# Patient Record
Sex: FEMALE | Race: WHITE | NOT HISPANIC OR LATINO | Employment: FULL TIME | URBAN - METROPOLITAN AREA
[De-identification: names, ages, dates, MRNs, and addresses within clinical notes are randomized per-mention and may not be internally consistent; named-entity substitution may affect disease eponyms.]

---

## 2017-03-27 DIAGNOSIS — E03.9 ADULT HYPOTHYROIDISM: ICD-10-CM

## 2017-03-27 DIAGNOSIS — E55.9 AVITAMINOSIS D: Primary | ICD-10-CM

## 2017-04-06 ENCOUNTER — LAB (OUTPATIENT)
Dept: ENDOCRINOLOGY | Age: 52
End: 2017-04-06

## 2017-04-06 DIAGNOSIS — E03.9 ADULT HYPOTHYROIDISM: ICD-10-CM

## 2017-04-06 DIAGNOSIS — E55.9 AVITAMINOSIS D: Primary | ICD-10-CM

## 2017-04-06 DIAGNOSIS — E55.9 AVITAMINOSIS D: ICD-10-CM

## 2017-04-08 LAB — 25(OH)D3+25(OH)D2 SERPL-MCNC: 40.6 NG/ML (ref 30–100)

## 2017-04-09 LAB
ALBUMIN SERPL-MCNC: 4.5 G/DL (ref 3.5–5.2)
ALBUMIN/GLOB SERPL: 1.6 G/DL
ALP SERPL-CCNC: 58 U/L (ref 39–117)
ALT SERPL-CCNC: 21 U/L (ref 1–33)
AST SERPL-CCNC: 19 U/L (ref 1–32)
BILIRUB SERPL-MCNC: 0.2 MG/DL (ref 0.1–1.2)
BUN SERPL-MCNC: 14 MG/DL (ref 6–20)
BUN/CREAT SERPL: 22.6 (ref 7–25)
CALCIUM SERPL-MCNC: 9.3 MG/DL (ref 8.6–10.5)
CHLORIDE SERPL-SCNC: 98 MMOL/L (ref 98–107)
CO2 SERPL-SCNC: 23.1 MMOL/L (ref 22–29)
CREAT SERPL-MCNC: 0.62 MG/DL (ref 0.57–1)
FT4I SERPL CALC-MCNC: 4.1 (ref 1.2–4.9)
GLOBULIN SER CALC-MCNC: 2.9 GM/DL
GLUCOSE SERPL-MCNC: 94 MG/DL (ref 65–99)
HBA1C MFR BLD: 5.53 % (ref 4.8–5.6)
POTASSIUM SERPL-SCNC: 4.3 MMOL/L (ref 3.5–5.2)
PROT SERPL-MCNC: 7.4 G/DL (ref 6–8.5)
SODIUM SERPL-SCNC: 139 MMOL/L (ref 136–145)
T3FREE SERPL-MCNC: 3 PG/ML (ref 2–4.4)
T3RU NFR SERPL: 28 % (ref 24–39)
T4 FREE SERPL-MCNC: 2.05 NG/DL (ref 0.93–1.7)
T4 SERPL-MCNC: 14.7 UG/DL (ref 4.5–12)
THYROGLOB AB SERPL-ACNC: <1 IU/ML
THYROGLOB SERPL-MCNC: 0.6 NG/ML
THYROGLOB SERPL-MCNC: NORMAL NG/ML
TSH SERPL DL<=0.005 MIU/L-ACNC: 0.2 UIU/ML (ref 0.45–4.5)

## 2017-04-18 ENCOUNTER — OFFICE VISIT (OUTPATIENT)
Dept: ENDOCRINOLOGY | Age: 52
End: 2017-04-18

## 2017-04-18 VITALS
BODY MASS INDEX: 25.79 KG/M2 | HEIGHT: 65 IN | SYSTOLIC BLOOD PRESSURE: 110 MMHG | WEIGHT: 154.8 LBS | DIASTOLIC BLOOD PRESSURE: 68 MMHG

## 2017-04-18 DIAGNOSIS — E55.9 VITAMIN D DEFICIENCY: ICD-10-CM

## 2017-04-18 DIAGNOSIS — I10 ESSENTIAL HYPERTENSION: ICD-10-CM

## 2017-04-18 DIAGNOSIS — Z71.6 TOBACCO ABUSE COUNSELING: ICD-10-CM

## 2017-04-18 DIAGNOSIS — E04.0 SIMPLE GOITER: ICD-10-CM

## 2017-04-18 DIAGNOSIS — N95.1 MENOPAUSAL SYMPTOM: ICD-10-CM

## 2017-04-18 DIAGNOSIS — E03.9 ACQUIRED HYPOTHYROIDISM: ICD-10-CM

## 2017-04-18 DIAGNOSIS — E04.1 THYROID NODULE: Primary | ICD-10-CM

## 2017-04-18 PROBLEM — G47.00 INSOMNIA: Status: ACTIVE | Noted: 2017-04-18

## 2017-04-18 PROBLEM — E89.0 H/O THYROIDECTOMY: Status: ACTIVE | Noted: 2017-04-18

## 2017-04-18 PROCEDURE — 99214 OFFICE O/P EST MOD 30 MIN: CPT | Performed by: NURSE PRACTITIONER

## 2017-04-18 PROCEDURE — 99406 BEHAV CHNG SMOKING 3-10 MIN: CPT | Performed by: NURSE PRACTITIONER

## 2017-04-18 RX ORDER — ERGOCALCIFEROL 1.25 MG/1
CAPSULE ORAL
Qty: 26 CAPSULE | Refills: 2 | Status: SHIPPED | OUTPATIENT
Start: 2017-04-18 | End: 2017-10-20 | Stop reason: SDUPTHER

## 2017-04-18 RX ORDER — LEVOTHYROXINE SODIUM 175 UG/1
175 TABLET ORAL DAILY
Qty: 90 TABLET | Refills: 3 | Status: SHIPPED | OUTPATIENT
Start: 2017-04-18 | End: 2017-10-20 | Stop reason: SDUPTHER

## 2017-04-18 RX ORDER — TRAZODONE HYDROCHLORIDE 50 MG/1
50 TABLET ORAL NIGHTLY
Qty: 30 TABLET | Refills: 5 | Status: SHIPPED | OUTPATIENT
Start: 2017-04-18 | End: 2017-10-20 | Stop reason: SDUPTHER

## 2017-04-18 NOTE — PROGRESS NOTES
"Darleen Holt is a 51 y.o. female is here today for follow-up.  Chief Complaint   Patient presents with   • Hypothyroidism     recent labs   • Vitamin D Deficiency     pt states only wants to discuss lab results     /68  Ht 65\" (165.1 cm)  Wt 154 lb 12.8 oz (70.2 kg)  BMI 25.76 kg/m2  Current Outpatient Prescriptions on File Prior to Visit   Medication Sig   • ADVAIR DISKUS 500-50 MCG/DOSE DISKUS Inhale 1 puff 2 (Two) Times a Day.   • LEVOXYL 175 MCG tablet Take 1 tablet by mouth daily.   • vitamin D (ERGOCALCIFEROL) 00360 UNITS capsule capsule Take one capsule by mouth twice a week     No current facility-administered medications on file prior to visit.      History reviewed. No pertinent family history.  Social History   Substance Use Topics   • Smoking status: Never Smoker   • Smokeless tobacco: Never Used   • Alcohol use No     No Known Allergies      History of Present Illness  Encounter Diagnoses   Name Primary?   • Thyroid nodule Yes   • Acquired hypothyroidism    • Vitamin D deficiency    • Essential hypertension    • Menopausal symptom    • Simple goiter    • Tobacco abuse counseling        This is a 51-year-old female patient here today for routine follow-up visit for the above-mentioned problems.  She is requesting assistance for smoking cessation at today's visit.  She has been in the past however she restarted smoking.  She is requesting Chantix.  She is also requesting medication to help her sleep at night.  Due to her potential risk for lung cancer she is also requesting a chest x-ray be done.  All this was discussed with Dr. Bowman and he approved the chest x-ray as well as treatment for insomnia.  She is taking her medications as prescribed.  She is complaining of some weight gain.  She denies any signs and symptoms of hyperthyroidism.  She does not want to change her current dose of levoxyl. She is  but going thru a divorce. She denies any cough but does have sob due to " smoking.   The following portions of the patient's history were reviewed and updated as appropriate: allergies, current medications, past family history, past medical history, past social history, past surgical history and problem list.    Review of Systems   Constitutional: Negative for fatigue.   HENT: Negative for trouble swallowing.    Eyes: Negative for visual disturbance.   Respiratory: Negative for shortness of breath.    Cardiovascular: Negative for leg swelling.   Gastrointestinal: Negative for nausea.   Endocrine: Negative for polyuria.   Genitourinary: Negative for urgency.   Musculoskeletal: Negative for joint swelling.   Skin: Negative for wound.   Allergic/Immunologic: Negative for immunocompromised state.   Neurological: Negative for numbness.   Hematological: Negative for adenopathy.   Psychiatric/Behavioral: The patient is not nervous/anxious.        Objective   Physical Exam   Constitutional: She is oriented to person, place, and time. She appears well-developed and well-nourished. No distress.   HENT:   Head: Normocephalic and atraumatic.   Right Ear: External ear normal.   Left Ear: External ear normal.   Nose: Nose normal.   Mouth/Throat: Oropharynx is clear and moist. No oropharyngeal exudate.   Eyes: Conjunctivae and EOM are normal. Pupils are equal, round, and reactive to light. Right eye exhibits no discharge. Left eye exhibits no discharge. No scleral icterus.   Neck: Normal range of motion. Neck supple. No JVD present. No tracheal deviation present. No thyromegaly present.   Healed the scar of thyroidectomy in lower neck.   Cardiovascular: Normal rate, regular rhythm, normal heart sounds and intact distal pulses.  Exam reveals no gallop and no friction rub.    No murmur heard.  Pulmonary/Chest: Effort normal and breath sounds normal. No stridor. No respiratory distress. She has no wheezes. She has no rales. She exhibits no tenderness.   Abdominal: Soft. Bowel sounds are normal. She  exhibits no distension and no mass. There is no tenderness. There is no rebound and no guarding. No hernia.   Musculoskeletal: Normal range of motion. She exhibits no edema, tenderness or deformity.   Lymphadenopathy:     She has no cervical adenopathy.   Neurological: She is alert and oriented to person, place, and time. She has normal reflexes. She displays normal reflexes. No cranial nerve deficit. She exhibits normal muscle tone. Coordination normal.   Skin: Skin is warm and dry. No rash noted. She is not diaphoretic. No erythema. No pallor.   Psychiatric: She has a normal mood and affect. Her behavior is normal. Judgment and thought content normal.   Nursing note and vitals reviewed.    Results for orders placed or performed in visit on 04/06/17   Comprehensive Metabolic Panel   Result Value Ref Range    Glucose 94 65 - 99 mg/dL    BUN 14 6 - 20 mg/dL    Creatinine 0.62 0.57 - 1.00 mg/dL    eGFR Non African Am 101 >60 mL/min/1.73    eGFR African Am 122 >60 mL/min/1.73    BUN/Creatinine Ratio 22.6 7.0 - 25.0    Sodium 139 136 - 145 mmol/L    Potassium 4.3 3.5 - 5.2 mmol/L    Chloride 98 98 - 107 mmol/L    Total CO2 23.1 22.0 - 29.0 mmol/L    Calcium 9.3 8.6 - 10.5 mg/dL    Total Protein 7.4 6.0 - 8.5 g/dL    Albumin 4.50 3.50 - 5.20 g/dL    Globulin 2.9 gm/dL    A/G Ratio 1.6 g/dL    Total Bilirubin 0.2 0.1 - 1.2 mg/dL    Alkaline Phosphatase 58 39 - 117 U/L    AST (SGOT) 19 1 - 32 U/L    ALT (SGPT) 21 1 - 33 U/L   Hemoglobin A1c   Result Value Ref Range    Hemoglobin A1C 5.53 4.80 - 5.60 %   Comprehensive Thyroglobulin   Result Value Ref Range    Thyroglobulin Ab <1.0 IU/mL    Thyroglobulin 0.6 ng/mL    Thyroglobulin (TG-INDER) Comment ng/mL   T4, Free   Result Value Ref Range    Free T4 2.05 (H) 0.93 - 1.70 ng/dL   T3, Free   Result Value Ref Range    T3, Free 3.0 2.0 - 4.4 pg/mL   Thyroid Panel With TSH   Result Value Ref Range    TSH 0.198 (L) 0.450 - 4.500 uIU/mL    T4, Total 14.7 (H) 4.5 - 12.0 ug/dL    T3  Uptake 28 24 - 39 %    Free Thyroxine Index 4.1 1.2 - 4.9   Vitamin D 25 Hydroxy   Result Value Ref Range    25 Hydroxy, Vitamin D 40.6 30.0 - 100.0 ng/mL         Assessment/Plan   Denisa was seen today for hypothyroidism and vitamin d deficiency.    Diagnoses and all orders for this visit:    Thyroid nodule    Acquired hypothyroidism    Vitamin D deficiency    Essential hypertension    Menopausal symptom    Simple goiter      Encounter Diagnoses   Name Primary?   • Thyroid nodule Yes   • Acquired hypothyroidism    • Vitamin D deficiency    • Essential hypertension    • Menopausal symptom    • Simple goiter    • Tobacco abuse counseling          In summary, patient was seen and examined.  I discussed her chest x-ray and treatment for insomnia with Dr. Bowman.  She will have a chest x-ray done and will be noted of the results along with any further recommendations.  Her recent labs reviewed and she was provided a copy.  I've started her on trazodone 50 mg as needed daily at bedtime for insomnia.  She will continue all her current medications as prescribed.  She was given a prescription along with a coupon for Chantix with instructions to take as directed.  She has a history of thyroidectomy.  Metabolically she is stable.  She will follow-up with Dr. Bowman her next appointment.  She was counseled at today's visit for 10 minutes for smoking cessation.

## 2017-04-18 NOTE — PATIENT INSTRUCTIONS
Take chantix according to instructions  Continue all other meds the same  Chest xray  trazadone 50 mg once at bedtime for sleep

## 2017-05-15 RX ORDER — VARENICLINE TARTRATE 1 MG/1
1 TABLET, FILM COATED ORAL 2 TIMES DAILY
Qty: 60 TABLET | Refills: 3 | Status: SHIPPED | OUTPATIENT
Start: 2017-05-15 | End: 2017-12-04

## 2017-05-16 ENCOUNTER — TELEPHONE (OUTPATIENT)
Dept: ENDOCRINOLOGY | Age: 52
End: 2017-05-16

## 2017-09-18 RX ORDER — LEVOTHYROXINE SODIUM 175 UG/1
TABLET ORAL
Qty: 90 TABLET | Refills: 2 | Status: SHIPPED | OUTPATIENT
Start: 2017-09-18 | End: 2017-10-20 | Stop reason: SDUPTHER

## 2017-09-28 DIAGNOSIS — E03.9 ACQUIRED HYPOTHYROIDISM: Primary | ICD-10-CM

## 2017-09-28 DIAGNOSIS — E55.9 VITAMIN D DEFICIENCY: ICD-10-CM

## 2017-10-20 ENCOUNTER — OFFICE VISIT (OUTPATIENT)
Dept: ENDOCRINOLOGY | Age: 52
End: 2017-10-20

## 2017-10-20 VITALS
SYSTOLIC BLOOD PRESSURE: 102 MMHG | DIASTOLIC BLOOD PRESSURE: 72 MMHG | WEIGHT: 148.8 LBS | BODY MASS INDEX: 24.79 KG/M2 | HEIGHT: 65 IN

## 2017-10-20 DIAGNOSIS — E55.9 VITAMIN D DEFICIENCY: ICD-10-CM

## 2017-10-20 DIAGNOSIS — I10 ESSENTIAL HYPERTENSION: ICD-10-CM

## 2017-10-20 DIAGNOSIS — E04.1 THYROID NODULE: ICD-10-CM

## 2017-10-20 DIAGNOSIS — E03.9 ACQUIRED HYPOTHYROIDISM: Primary | ICD-10-CM

## 2017-10-20 DIAGNOSIS — E04.0 SIMPLE GOITER: ICD-10-CM

## 2017-10-20 DIAGNOSIS — E89.0 H/O THYROIDECTOMY: ICD-10-CM

## 2017-10-20 PROCEDURE — 99214 OFFICE O/P EST MOD 30 MIN: CPT | Performed by: NURSE PRACTITIONER

## 2017-10-20 RX ORDER — TRAZODONE HYDROCHLORIDE 50 MG/1
50 TABLET ORAL NIGHTLY
Qty: 90 TABLET | Refills: 1 | Status: SHIPPED | OUTPATIENT
Start: 2017-10-20 | End: 2018-05-04 | Stop reason: ALTCHOICE

## 2017-10-20 RX ORDER — LEVOTHYROXINE SODIUM 175 UG/1
175 TABLET ORAL DAILY
Qty: 90 TABLET | Refills: 3 | Status: SHIPPED | OUTPATIENT
Start: 2017-10-20 | End: 2017-12-04 | Stop reason: SDUPTHER

## 2017-10-20 RX ORDER — ERGOCALCIFEROL 1.25 MG/1
CAPSULE ORAL
Qty: 26 CAPSULE | Refills: 2 | Status: SHIPPED | OUTPATIENT
Start: 2017-10-20 | End: 2018-05-11 | Stop reason: SDUPTHER

## 2017-10-20 NOTE — PROGRESS NOTES
"Darleen Holt is a 52 y.o. female is here today for follow-up.  Chief Complaint   Patient presents with   • Hypothyroidism     no recent labs, pt is not taking chantix   • Vitamin D Deficiency   • Hypertension   • Goiter     /72  Ht 65\" (165.1 cm)  Wt 148 lb 12.8 oz (67.5 kg)  BMI 24.76 kg/m2  Current Outpatient Prescriptions on File Prior to Visit   Medication Sig   • ADVAIR DISKUS 500-50 MCG/DOSE DISKUS INHALE 1 PUFF 2 (TWO) TIMES A DAY.   • [DISCONTINUED] LEVOXYL 175 MCG tablet Take 1 tablet by mouth Daily.   • [DISCONTINUED] traZODone (DESYREL) 50 MG tablet Take 1 tablet by mouth Every Night.   • [DISCONTINUED] vitamin D (ERGOCALCIFEROL) 22851 UNITS capsule capsule Take one capsule by mouth twice a week   • varenicline (CHANTIX CONTINUING MONTH ARPAN) 1 MG tablet Take 1 tablet by mouth 2 (Two) Times a Day.   • [DISCONTINUED] LEVOXYL 175 MCG tablet TAKE 1 TABLET BY MOUTH DAILY.     No current facility-administered medications on file prior to visit.      No family history on file.  Social History   Substance Use Topics   • Smoking status: Never Smoker   • Smokeless tobacco: Never Used   • Alcohol use No     No Known Allergies    History of Present Illness   Chief Complaint   Patient presents with   • Hypothyroidism     no recent labs, pt is not taking chantix   • Vitamin D Deficiency   • Hypertension   • Goiter     52-year-old female patient here today for routine follow-up for hypothyroidism, vitamin D deficiency, goiter, and hypertension.  She is taking her medications as prescribed.  She denies any signs and symptoms of hypothyroidism.  She has a lot of stress due to her work life.  She has stopped smoking.  She has a history of asthma that she feels is not controlled at this time.  She is also been taking her Advair once daily.  She is managed by her primary care provider for her asthma.  She states she has shortness of breath several times a day.      The following portions of the " patient's history were reviewed and updated as appropriate: allergies, current medications, past family history, past medical history, past social history, past surgical history and problem list.    Review of Systems   Constitutional: Negative for fatigue.   HENT: Negative for trouble swallowing.    Eyes: Negative for visual disturbance.   Respiratory: Negative for shortness of breath.    Cardiovascular: Negative for leg swelling.   Endocrine: Negative for polyphagia.   Skin: Negative for wound.   Neurological: Negative for numbness.       Objective   Physical Exam   Constitutional: She is oriented to person, place, and time. She appears well-developed and well-nourished. No distress.   HENT:   Head: Normocephalic and atraumatic.   Right Ear: External ear normal.   Left Ear: External ear normal.   Nose: Nose normal.   Mouth/Throat: Oropharynx is clear and moist. No oropharyngeal exudate.   Eyes: Conjunctivae and EOM are normal. Pupils are equal, round, and reactive to light. Right eye exhibits no discharge. Left eye exhibits no discharge. No scleral icterus.   Neck: Normal range of motion. Neck supple. No JVD present. No tracheal deviation present. No thyromegaly present.   Healed the scar of thyroidectomy in lower neck.   Cardiovascular: Normal rate, regular rhythm, normal heart sounds and intact distal pulses.  Exam reveals no gallop and no friction rub.    No murmur heard.  Pulmonary/Chest: Effort normal and breath sounds normal. No stridor. No respiratory distress. She has no wheezes. She has no rales. She exhibits no tenderness.   Abdominal: Soft. Bowel sounds are normal. She exhibits no distension and no mass. There is no tenderness. There is no rebound and no guarding. No hernia.   Musculoskeletal: Normal range of motion. She exhibits no edema, tenderness or deformity.   Lymphadenopathy:     She has no cervical adenopathy.   Neurological: She is alert and oriented to person, place, and time. She has normal  reflexes. She displays normal reflexes. No cranial nerve deficit. She exhibits normal muscle tone. Coordination normal.   Skin: Skin is warm and dry. No rash noted. She is not diaphoretic. No erythema. No pallor.   Psychiatric: She has a normal mood and affect. Her behavior is normal. Judgment and thought content normal.   Nursing note and vitals reviewed.    Results for orders placed or performed in visit on 04/06/17   Comprehensive Metabolic Panel   Result Value Ref Range    Glucose 94 65 - 99 mg/dL    BUN 14 6 - 20 mg/dL    Creatinine 0.62 0.57 - 1.00 mg/dL    eGFR Non African Am 101 >60 mL/min/1.73    eGFR African Am 122 >60 mL/min/1.73    BUN/Creatinine Ratio 22.6 7.0 - 25.0    Sodium 139 136 - 145 mmol/L    Potassium 4.3 3.5 - 5.2 mmol/L    Chloride 98 98 - 107 mmol/L    Total CO2 23.1 22.0 - 29.0 mmol/L    Calcium 9.3 8.6 - 10.5 mg/dL    Total Protein 7.4 6.0 - 8.5 g/dL    Albumin 4.50 3.50 - 5.20 g/dL    Globulin 2.9 gm/dL    A/G Ratio 1.6 g/dL    Total Bilirubin 0.2 0.1 - 1.2 mg/dL    Alkaline Phosphatase 58 39 - 117 U/L    AST (SGOT) 19 1 - 32 U/L    ALT (SGPT) 21 1 - 33 U/L   Hemoglobin A1c   Result Value Ref Range    Hemoglobin A1C 5.53 4.80 - 5.60 %   Comprehensive Thyroglobulin   Result Value Ref Range    Thyroglobulin Ab <1.0 IU/mL    Thyroglobulin 0.6 ng/mL    Thyroglobulin (TG-INDER) Comment ng/mL   T4, Free   Result Value Ref Range    Free T4 2.05 (H) 0.93 - 1.70 ng/dL   T3, Free   Result Value Ref Range    T3, Free 3.0 2.0 - 4.4 pg/mL   Thyroid Panel With TSH   Result Value Ref Range    TSH 0.198 (L) 0.450 - 4.500 uIU/mL    T4, Total 14.7 (H) 4.5 - 12.0 ug/dL    T3 Uptake 28 24 - 39 %    Free Thyroxine Index 4.1 1.2 - 4.9   Vitamin D 25 Hydroxy   Result Value Ref Range    25 Hydroxy, Vitamin D 40.6 30.0 - 100.0 ng/mL         Assessment/Plan   Problems Addressed this Visit        Cardiovascular and Mediastinum    Hypertension       Digestive    Vitamin D deficiency       Endocrine    Hypothyroidism -  Primary    Relevant Medications    LEVOXYL 175 MCG tablet    Simple goiter    Relevant Medications    LEVOXYL 175 MCG tablet    Thyroid nodule    Relevant Medications    LEVOXYL 175 MCG tablet        In summary, patient was seen and examined.  She was educated regarding uncontrolled asthma.  She's been advised to take her Advair twice daily as prescribed.  She will have extensive laboratory evaluation done at today's visit.  Also results with any further recommendations.  In the meantime she is to continue all her current medications as prescribed.  Her DEXA scan was reviewed from one year ago which show she has osteopenia.  She has no signs and symptoms of hypothyroidism at today's visit.  She will follow up Dr. Bowman in 6 months with labs.  I've encouraged her to contact the office should she have any questions or concerns prior to then.

## 2017-10-21 LAB
25(OH)D3+25(OH)D2 SERPL-MCNC: 43.9 NG/ML (ref 30–100)
ALBUMIN SERPL-MCNC: 4.2 G/DL (ref 3.5–5.2)
ALBUMIN/GLOB SERPL: 1.4 G/DL
ALP SERPL-CCNC: 62 U/L (ref 39–117)
ALT SERPL-CCNC: 17 U/L (ref 1–33)
AST SERPL-CCNC: 22 U/L (ref 1–32)
BILIRUB SERPL-MCNC: 0.2 MG/DL (ref 0.1–1.2)
BUN SERPL-MCNC: 8 MG/DL (ref 6–20)
BUN/CREAT SERPL: 11.4 (ref 7–25)
CALCIUM SERPL-MCNC: 9.6 MG/DL (ref 8.6–10.5)
CHLORIDE SERPL-SCNC: 100 MMOL/L (ref 98–107)
CHOLEST SERPL-MCNC: 190 MG/DL (ref 0–200)
CO2 SERPL-SCNC: 24.4 MMOL/L (ref 22–29)
CREAT SERPL-MCNC: 0.7 MG/DL (ref 0.57–1)
FT4I SERPL CALC-MCNC: 4.6 (ref 1.2–4.9)
GFR SERPLBLD CREATININE-BSD FMLA CKD-EPI: 106 ML/MIN/1.73
GFR SERPLBLD CREATININE-BSD FMLA CKD-EPI: 87 ML/MIN/1.73
GLOBULIN SER CALC-MCNC: 3 GM/DL
GLUCOSE SERPL-MCNC: 114 MG/DL (ref 65–99)
HBA1C MFR BLD: 5.26 % (ref 4.8–5.6)
HDLC SERPL-MCNC: 72 MG/DL (ref 40–60)
LDLC SERPL CALC-MCNC: 101 MG/DL (ref 0–100)
POTASSIUM SERPL-SCNC: 3.9 MMOL/L (ref 3.5–5.2)
PROT SERPL-MCNC: 7.2 G/DL (ref 6–8.5)
SODIUM SERPL-SCNC: 141 MMOL/L (ref 136–145)
T3FREE SERPL-MCNC: 2.8 PG/ML (ref 2–4.4)
T3RU NFR SERPL: 33 % (ref 24–39)
T4 FREE SERPL-MCNC: 2.13 NG/DL (ref 0.93–1.7)
T4 SERPL-MCNC: 14 UG/DL (ref 4.5–12)
TRIGL SERPL-MCNC: 84 MG/DL (ref 0–150)
TSH SERPL DL<=0.005 MIU/L-ACNC: 0.1 UIU/ML (ref 0.45–4.5)
VLDLC SERPL CALC-MCNC: 16.8 MG/DL (ref 5–40)

## 2017-10-28 ENCOUNTER — RESULTS ENCOUNTER (OUTPATIENT)
Dept: ENDOCRINOLOGY | Age: 52
End: 2017-10-28

## 2017-10-28 DIAGNOSIS — E03.9 ACQUIRED HYPOTHYROIDISM: ICD-10-CM

## 2017-10-28 DIAGNOSIS — E55.9 VITAMIN D DEFICIENCY: ICD-10-CM

## 2017-12-04 ENCOUNTER — OFFICE VISIT (OUTPATIENT)
Dept: FAMILY MEDICINE CLINIC | Facility: CLINIC | Age: 52
End: 2017-12-04

## 2017-12-04 VITALS
WEIGHT: 149 LBS | SYSTOLIC BLOOD PRESSURE: 98 MMHG | HEIGHT: 65 IN | DIASTOLIC BLOOD PRESSURE: 64 MMHG | HEART RATE: 64 BPM | BODY MASS INDEX: 24.83 KG/M2 | OXYGEN SATURATION: 99 %

## 2017-12-04 DIAGNOSIS — B35.1 ONYCHOMYCOSIS: ICD-10-CM

## 2017-12-04 DIAGNOSIS — E04.1 THYROID NODULE: ICD-10-CM

## 2017-12-04 DIAGNOSIS — E03.9 ACQUIRED HYPOTHYROIDISM: ICD-10-CM

## 2017-12-04 DIAGNOSIS — Z12.4 SCREENING FOR CERVICAL CANCER: ICD-10-CM

## 2017-12-04 DIAGNOSIS — E89.0 H/O THYROIDECTOMY: ICD-10-CM

## 2017-12-04 DIAGNOSIS — E55.9 VITAMIN D DEFICIENCY: ICD-10-CM

## 2017-12-04 DIAGNOSIS — Z00.00 HEALTHCARE MAINTENANCE: Primary | ICD-10-CM

## 2017-12-04 DIAGNOSIS — E04.0 SIMPLE GOITER: ICD-10-CM

## 2017-12-04 DIAGNOSIS — Z79.899 HIGH RISK MEDICATION USE: ICD-10-CM

## 2017-12-04 PROCEDURE — 90472 IMMUNIZATION ADMIN EACH ADD: CPT | Performed by: FAMILY MEDICINE

## 2017-12-04 PROCEDURE — 90732 PPSV23 VACC 2 YRS+ SUBQ/IM: CPT | Performed by: FAMILY MEDICINE

## 2017-12-04 PROCEDURE — 90686 IIV4 VACC NO PRSV 0.5 ML IM: CPT | Performed by: FAMILY MEDICINE

## 2017-12-04 PROCEDURE — 99396 PREV VISIT EST AGE 40-64: CPT | Performed by: FAMILY MEDICINE

## 2017-12-04 PROCEDURE — 90471 IMMUNIZATION ADMIN: CPT | Performed by: FAMILY MEDICINE

## 2017-12-04 RX ORDER — LEVOTHYROXINE SODIUM 175 UG/1
175 TABLET ORAL DAILY
Qty: 120 TABLET | Refills: 0 | Status: SHIPPED | OUTPATIENT
Start: 2017-12-04 | End: 2018-05-11 | Stop reason: SDUPTHER

## 2017-12-04 RX ORDER — TERBINAFINE HYDROCHLORIDE 250 MG/1
250 TABLET ORAL DAILY
Qty: 30 TABLET | Refills: 1 | Status: SHIPPED | OUTPATIENT
Start: 2017-12-04 | End: 2018-05-04

## 2017-12-04 NOTE — PROGRESS NOTES
Sundar Holt is a 52 y.o. female.      Assessment/Plan   Problem List Items Addressed This Visit        Digestive    Vitamin D deficiency    Relevant Medications    LEVOXYL 175 MCG tablet       Endocrine    Hypothyroidism    Overview     Caroline 12/4/2017  A little over treated. She will discuss with Dr. Bowman.          Relevant Medications    LEVOXYL 175 MCG tablet    Simple goiter    Overview     Overview:   2015 IMO UPDATE         Relevant Medications    LEVOXYL 175 MCG tablet    Thyroid nodule    Relevant Medications    LEVOXYL 175 MCG tablet       Other    H/O thyroidectomy    Relevant Medications    LEVOXYL 175 MCG tablet      Other Visit Diagnoses     Healthcare maintenance    -  Primary    Relevant Orders    Ambulatory Referral For Screening Colonoscopy    Screening for cervical cancer        Relevant Orders    Pap IG, HPV-hr - ThinPrep Vial, Cervix    Onychomycosis        RTO in six weeks for CMP already ordered.     Relevant Medications    terbinafine (LAMISIL) 250 MG tablet    High risk medication use        Relevant Orders    Comprehensive Metabolic Panel    Essential hypertension        Relevant Medications    LEVOXYL 175 MCG tablet             Return in about 1 year (around 12/4/2018).  Patient Instructions     Results for SUNDAR HOLT (MRN [id number]) as of 12/4/2017 10:45   Ref. Range 12/21/2016 13:04 4/6/2017 13:31 4/6/2017 13:31 4/7/2017 08:25 10/20/2017 14:53   TSH Baseline Latest Ref Range: 0.450 - 4.500 uIU/mL  0.198 (L)   0.105 (L)   T4, Total Latest Ref Range: 4.5 - 12.0 ug/dL  14.7 (H)   14.0 (H)   Free T4 Latest Ref Range: 0.93 - 1.70 ng/dL  2.05 (H)   2.13 (H)   T3, Free Latest Ref Range: 2.0 - 4.4 pg/mL  3.0   2.8   T3 Uptake Latest Ref Range: 24 - 39 %  28   33   Free Thyroxine Index Latest Ref Range: 1.2 - 4.9   4.1   4.6   Thyroglobulin Latest Units: ng/mL  Comment 0.6     Thyroglobulin Ab Latest Units: IU/mL  <1.0         Ref. Range 10/20/2017 14:53   Total Cholesterol Latest Ref  Range: 0 - 200 mg/dL 190   HDL Cholesterol Latest Ref Range: 40 - 60 mg/dL 72 (H)   LDL Cholesterol  Latest Ref Range: 0 - 100 mg/dL 101 (H)   VLDL Cholesterol Latest Ref Range: 5 - 40 mg/dL 16.8   Triglycerides Latest Ref Range: 0 - 150 mg/dL 84     return to office  for labs in six weeks then we will recheck liver function and give you script for last month of lamisil.       Chief Complaint   Patient presents with   • Annual Exam     Social History   Substance Use Topics   • Smoking status: Former Smoker     Start date: 5/1/2017   • Smokeless tobacco: Never Used   • Alcohol use No       History of Present Illness     Annual Exam: Patient presents for annual exam.  findings; last pap: approximate date 2013 and was normal  Last mammo: approximate date 2017 and was normal   The patient is not  still having menses.Last menstrual period: >1 year ago.  History; colonoscopy: Last colonoscopy: more that ten years ago    The patient wears seatbelts: yes.  The patient regularly exercises: no. She does none   This patient has ever been tested for HepC: no    She does see a dentist regularly.   Her immunization are not up-to-date.  She is eating a healthy diet.     The following portions of the patient's history were reviewed and updated as appropriate:PMHroutine: Social history , Past Medical History, Surgical history , Allergies, Current Medications, Active Problem List, Family History and Health Maintenance    Review of Systems   Constitutional: Negative for activity change, appetite change, chills, fatigue, fever and unexpected weight change.   HENT: Negative for congestion, ear pain, hearing loss, nosebleeds, rhinorrhea and sore throat.    Eyes: Negative for pain, redness and visual disturbance.   Respiratory: Negative for cough, shortness of breath and wheezing.    Cardiovascular: Negative for chest pain, palpitations and leg swelling.   Gastrointestinal: Negative for abdominal pain, blood in stool, constipation,  "diarrhea, nausea and vomiting.   Endocrine: Negative for cold intolerance and heat intolerance.   Genitourinary: Negative for difficulty urinating, dysuria, frequency, hematuria, pelvic pain, urgency and vaginal discharge.   Musculoskeletal: Negative for arthralgias, back pain and joint swelling.   Skin: Negative for rash and wound.   Neurological: Negative for dizziness, weakness, numbness and headaches.   Hematological: Does not bruise/bleed easily.   Psychiatric/Behavioral: Negative for dysphoric mood, sleep disturbance and suicidal ideas. The patient is not nervous/anxious.        Objective   Vitals:    12/04/17 1009   BP: 98/64   Pulse: 64   SpO2: 99%   Weight: 149 lb (67.6 kg)   Height: 65\" (165.1 cm)     Body mass index is 24.79 kg/(m^2).  Physical Exam   Constitutional: She is oriented to person, place, and time. She appears well-developed and well-nourished. No distress.   HENT:   Head: Normocephalic and atraumatic.   Right Ear: Tympanic membrane, external ear and ear canal normal.   Left Ear: Tympanic membrane, external ear and ear canal normal.   Mouth/Throat: Oropharynx is clear and moist.   Eyes: Conjunctivae are normal.   Neck: Normal range of motion. Neck supple. No tracheal deviation present. No thyromegaly present.   Cardiovascular: Normal rate, regular rhythm, normal heart sounds and intact distal pulses.  Exam reveals no gallop.    No murmur heard.  Pulmonary/Chest: Effort normal and breath sounds normal. No respiratory distress. She has no wheezes. She has no rales. She exhibits no tenderness. Right breast exhibits no mass, no nipple discharge and no skin change. Left breast exhibits no mass, no nipple discharge and no skin change. Breasts are symmetrical. There is no breast swelling.   Abdominal: Soft. Bowel sounds are normal. She exhibits no distension. There is no hepatosplenomegaly. There is no tenderness.   Genitourinary: Vagina normal and uterus normal. No breast tenderness. Pelvic exam was " performed with patient supine. There is no rash or lesion on the right labia. There is no rash or lesion on the left labia. Uterus is not enlarged and not tender. Cervix exhibits no motion tenderness, no discharge and no friability. Right adnexum displays no mass, no tenderness and no fullness. Left adnexum displays no mass, no tenderness and no fullness. No erythema, tenderness or bleeding in the vagina. No vaginal discharge found.   Musculoskeletal: She exhibits no edema or deformity.   Lymphadenopathy:     She has no cervical adenopathy.   Neurological: She is alert and oriented to person, place, and time.   Skin: Skin is warm and dry. No rash noted.   Onycholysis on bilateral big toes but L>R   Psychiatric: She has a normal mood and affect. Her behavior is normal. Judgment and thought content normal.   Vitals reviewed.    Reviewed Data:  No visits with results within 1 Month(s) from this visit.  Latest known visit with results is:    Office Visit on 10/20/2017   Component Value Ref Range   • Glucose 114* 65 - 99 mg/dL   • BUN 8  6 - 20 mg/dL   • Creatinine 0.70  0.57 - 1.00 mg/dL   • eGFR Non African Am 87  >60 mL/min/1.73   • BUN/Creatinine Ratio 11.4  7.0 - 25.0   • Sodium 141  136 - 145 mmol/L   • Potassium 3.9  3.5 - 5.2 mmol/L   • Chloride 100  98 - 107 mmol/L   • Total CO2 24.4  22.0 - 29.0 mmol/L   • Calcium 9.6  8.6 - 10.5 mg/dL   • Total Protein 7.2  6.0 - 8.5 g/dL   • Albumin 4.20  3.50 - 5.20 g/dL   • Globulin 3.0  gm/dL   • A/G Ratio 1.4  g/dL   • Total Bilirubin 0.2  0.1 - 1.2 mg/dL   • Alkaline Phosphatase 62  39 - 117 U/L   • AST (SGOT) 22  1 - 32 U/L   • ALT (SGPT) 17  1 - 33 U/L   • Total Cholesterol 190  0 - 200 mg/dL   • Triglycerides 84  0 - 150 mg/dL   • HDL Cholesterol 72* 40 - 60 mg/dL   • VLDL Cholesterol 16.8  5 - 40 mg/dL   • LDL Cholesterol  101* 0 - 100 mg/dL   • T3, Free 2.8  2.0 - 4.4 pg/mL   • Free T4 2.13* 0.93 - 1.70 ng/dL   • TSH 0.105* 0.450 - 4.500 uIU/mL   • T4, Total 14.0*  4.5 - 12.0 ug/dL   • T3 Uptake 33  24 - 39 %   • Free Thyroxine Index 4.6  1.2 - 4.9   • 25 Hydroxy, Vitamin D 43.9  30.0 - 100.0 ng/mL   • Hemoglobin A1C 5.26  4.80 - 5.60 %

## 2017-12-04 NOTE — PATIENT INSTRUCTIONS
Results for SUNDAR BANG (MRN [id number]) as of 12/4/2017 10:45   Ref. Range 12/21/2016 13:04 4/6/2017 13:31 4/6/2017 13:31 4/7/2017 08:25 10/20/2017 14:53   TSH Baseline Latest Ref Range: 0.450 - 4.500 uIU/mL  0.198 (L)   0.105 (L)   T4, Total Latest Ref Range: 4.5 - 12.0 ug/dL  14.7 (H)   14.0 (H)   Free T4 Latest Ref Range: 0.93 - 1.70 ng/dL  2.05 (H)   2.13 (H)   T3, Free Latest Ref Range: 2.0 - 4.4 pg/mL  3.0   2.8   T3 Uptake Latest Ref Range: 24 - 39 %  28   33   Free Thyroxine Index Latest Ref Range: 1.2 - 4.9   4.1   4.6   Thyroglobulin Latest Units: ng/mL  Comment 0.6     Thyroglobulin Ab Latest Units: IU/mL  <1.0         Ref. Range 10/20/2017 14:53   Total Cholesterol Latest Ref Range: 0 - 200 mg/dL 190   HDL Cholesterol Latest Ref Range: 40 - 60 mg/dL 72 (H)   LDL Cholesterol  Latest Ref Range: 0 - 100 mg/dL 101 (H)   VLDL Cholesterol Latest Ref Range: 5 - 40 mg/dL 16.8   Triglycerides Latest Ref Range: 0 - 150 mg/dL 84     return to office  for labs in six weeks then we will recheck liver function and give you script for last month of lamisil.

## 2017-12-07 LAB
CYTOLOGIST CVX/VAG CYTO: NORMAL
CYTOLOGY CVX/VAG DOC THIN PREP: NORMAL
DX ICD CODE: NORMAL
HIV 1 & 2 AB SER-IMP: NORMAL
HPV I/H RISK 1 DNA CVX QL PROBE+SIG AMP: NORMAL
HPV I/H RISK 4 DNA CVX QL PROBE+SIG AMP: NEGATIVE
OTHER STN SPEC: NORMAL
PATH REPORT.FINAL DX SPEC: NORMAL
STAT OF ADQ CVX/VAG CYTO-IMP: NORMAL

## 2018-01-18 ENCOUNTER — RESULTS ENCOUNTER (OUTPATIENT)
Dept: FAMILY MEDICINE CLINIC | Facility: CLINIC | Age: 53
End: 2018-01-18

## 2018-01-18 DIAGNOSIS — Z79.899 HIGH RISK MEDICATION USE: ICD-10-CM

## 2018-04-09 ENCOUNTER — RESULTS ENCOUNTER (OUTPATIENT)
Dept: ENDOCRINOLOGY | Age: 53
End: 2018-04-09

## 2018-04-09 DIAGNOSIS — Z80.0 FAMILY HISTORY OF COLON CANCER REQUIRING SCREENING COLONOSCOPY: ICD-10-CM

## 2018-04-09 DIAGNOSIS — M81.0 OSTEOPOROSIS, UNSPECIFIED OSTEOPOROSIS TYPE, UNSPECIFIED PATHOLOGICAL FRACTURE PRESENCE: ICD-10-CM

## 2018-04-09 DIAGNOSIS — Z71.6 TOBACCO ABUSE COUNSELING: Primary | ICD-10-CM

## 2018-04-09 DIAGNOSIS — E04.0 SIMPLE GOITER: ICD-10-CM

## 2018-04-18 DIAGNOSIS — Z72.0 TOBACCO USE: Primary | ICD-10-CM

## 2018-04-26 ENCOUNTER — LAB (OUTPATIENT)
Dept: ENDOCRINOLOGY | Age: 53
End: 2018-04-26

## 2018-04-26 DIAGNOSIS — Z71.6 TOBACCO ABUSE COUNSELING: ICD-10-CM

## 2018-04-26 DIAGNOSIS — E04.1 THYROID NODULE: ICD-10-CM

## 2018-04-26 DIAGNOSIS — E55.9 VITAMIN D DEFICIENCY: Primary | ICD-10-CM

## 2018-04-26 DIAGNOSIS — E04.0 SIMPLE GOITER: ICD-10-CM

## 2018-04-26 DIAGNOSIS — M81.0 OSTEOPOROSIS, UNSPECIFIED OSTEOPOROSIS TYPE, UNSPECIFIED PATHOLOGICAL FRACTURE PRESENCE: ICD-10-CM

## 2018-04-26 DIAGNOSIS — E03.9 ACQUIRED HYPOTHYROIDISM: ICD-10-CM

## 2018-04-26 DIAGNOSIS — Z72.0 TOBACCO USE: ICD-10-CM

## 2018-04-26 DIAGNOSIS — E55.9 VITAMIN D DEFICIENCY: ICD-10-CM

## 2018-04-30 LAB
25(OH)D3+25(OH)D2 SERPL-MCNC: 62 NG/ML (ref 30–100)
ALBUMIN SERPL-MCNC: 4.6 G/DL (ref 3.5–5.2)
ALBUMIN/GLOB SERPL: 1.6 G/DL
ALP SERPL-CCNC: 58 U/L (ref 39–117)
ALT SERPL-CCNC: 19 U/L (ref 1–33)
AST SERPL-CCNC: 23 U/L (ref 1–32)
BILIRUB SERPL-MCNC: 0.2 MG/DL (ref 0.1–1.2)
BUN SERPL-MCNC: 15 MG/DL (ref 6–20)
BUN/CREAT SERPL: 21.1 (ref 7–25)
CALCIUM SERPL-MCNC: 9.8 MG/DL (ref 8.6–10.5)
CHLORIDE SERPL-SCNC: 103 MMOL/L (ref 98–107)
CHOLEST SERPL-MCNC: 225 MG/DL (ref 0–200)
CO2 SERPL-SCNC: 24.4 MMOL/L (ref 22–29)
CREAT SERPL-MCNC: 0.71 MG/DL (ref 0.57–1)
GFR SERPLBLD CREATININE-BSD FMLA CKD-EPI: 104 ML/MIN/1.73
GFR SERPLBLD CREATININE-BSD FMLA CKD-EPI: 86 ML/MIN/1.73
GLOBULIN SER CALC-MCNC: 2.8 GM/DL
GLUCOSE SERPL-MCNC: 92 MG/DL (ref 65–99)
HDLC SERPL-MCNC: 65 MG/DL (ref 40–60)
INTERPRETATION: NORMAL
LDLC SERPL CALC-MCNC: 142 MG/DL (ref 0–100)
POTASSIUM SERPL-SCNC: 4.2 MMOL/L (ref 3.5–5.2)
PROT SERPL-MCNC: 7.4 G/DL (ref 6–8.5)
SODIUM SERPL-SCNC: 141 MMOL/L (ref 136–145)
T3FREE SERPL-MCNC: 3.1 PG/ML (ref 2–4.4)
T4 FREE SERPL-MCNC: 2.1 NG/DL (ref 0.93–1.7)
T4 SERPL-MCNC: 13.86 MCG/DL (ref 4.5–11.7)
THYROGLOB AB SERPL-ACNC: <1 IU/ML
THYROGLOB SERPL-MCNC: 0.5 NG/ML
THYROGLOB SERPL-MCNC: NORMAL NG/ML
TRIGL SERPL-MCNC: 92 MG/DL (ref 0–150)
TSH SERPL DL<=0.005 MIU/L-ACNC: 0.33 MIU/ML (ref 0.27–4.2)
URATE SERPL-MCNC: 3.9 MG/DL (ref 2.4–5.7)
VLDLC SERPL CALC-MCNC: 18.4 MG/DL (ref 5–40)

## 2018-05-02 ENCOUNTER — TELEPHONE (OUTPATIENT)
Dept: FAMILY MEDICINE CLINIC | Facility: CLINIC | Age: 53
End: 2018-05-02

## 2018-05-02 ENCOUNTER — HOSPITAL ENCOUNTER (OUTPATIENT)
Dept: GENERAL RADIOLOGY | Facility: HOSPITAL | Age: 53
Discharge: HOME | End: 2018-05-02
Admitting: FAMILY MEDICINE

## 2018-05-02 DIAGNOSIS — R06.02 SOB (SHORTNESS OF BREATH): ICD-10-CM

## 2018-05-02 DIAGNOSIS — R06.02 SOB (SHORTNESS OF BREATH): Primary | ICD-10-CM

## 2018-05-02 PROCEDURE — 71046 X-RAY EXAM CHEST 2 VIEWS: CPT

## 2018-05-02 NOTE — TELEPHONE ENCOUNTER
Patient is on the schedule for Friday 5/4/18 asking for chest xray. I called patient to see what was going on. Patient states that she is having shortness of breath x2 weeks and her inhaler is not helping. I spoke with Dr. Robert who advised the patient to go to ER immediately because she could have a blood clot. I told patient this and she declined. I then informed Dr. Robert of patients decision. Dr. Robert called to speak with patient.

## 2018-05-04 ENCOUNTER — HOSPITAL ENCOUNTER (OUTPATIENT)
Dept: CT IMAGING | Facility: HOSPITAL | Age: 53
Discharge: HOME | End: 2018-05-04
Admitting: FAMILY MEDICINE

## 2018-05-04 ENCOUNTER — OFFICE VISIT (OUTPATIENT)
Dept: FAMILY MEDICINE CLINIC | Facility: CLINIC | Age: 53
End: 2018-05-04

## 2018-05-04 VITALS
HEIGHT: 65 IN | WEIGHT: 155 LBS | DIASTOLIC BLOOD PRESSURE: 62 MMHG | HEART RATE: 74 BPM | OXYGEN SATURATION: 99 % | RESPIRATION RATE: 16 BRPM | BODY MASS INDEX: 25.83 KG/M2 | SYSTOLIC BLOOD PRESSURE: 98 MMHG

## 2018-05-04 DIAGNOSIS — R06.02 SHORTNESS OF BREATH: ICD-10-CM

## 2018-05-04 DIAGNOSIS — R00.2 PALPITATIONS: ICD-10-CM

## 2018-05-04 DIAGNOSIS — Z00.00 ROUTINE GENERAL MEDICAL EXAMINATION AT A HEALTH CARE FACILITY: ICD-10-CM

## 2018-05-04 DIAGNOSIS — J45.40 MODERATE PERSISTENT ASTHMA WITHOUT COMPLICATION: Primary | ICD-10-CM

## 2018-05-04 PROBLEM — Z71.6 TOBACCO ABUSE COUNSELING: Status: RESOLVED | Noted: 2017-04-18 | Resolved: 2018-05-04

## 2018-05-04 LAB — CREAT BLDA-MCNC: 0.6 MG/DL (ref 0.6–1.3)

## 2018-05-04 PROCEDURE — 94060 EVALUATION OF WHEEZING: CPT | Performed by: FAMILY MEDICINE

## 2018-05-04 PROCEDURE — 90632 HEPA VACCINE ADULT IM: CPT | Performed by: FAMILY MEDICINE

## 2018-05-04 PROCEDURE — 90471 IMMUNIZATION ADMIN: CPT | Performed by: FAMILY MEDICINE

## 2018-05-04 PROCEDURE — 93000 ELECTROCARDIOGRAM COMPLETE: CPT | Performed by: FAMILY MEDICINE

## 2018-05-04 PROCEDURE — 71275 CT ANGIOGRAPHY CHEST: CPT

## 2018-05-04 PROCEDURE — 82565 ASSAY OF CREATININE: CPT

## 2018-05-04 PROCEDURE — 0 IOPAMIDOL PER 1 ML: Performed by: FAMILY MEDICINE

## 2018-05-04 PROCEDURE — 99213 OFFICE O/P EST LOW 20 MIN: CPT | Performed by: FAMILY MEDICINE

## 2018-05-04 PROCEDURE — 94640 AIRWAY INHALATION TREATMENT: CPT | Performed by: FAMILY MEDICINE

## 2018-05-04 RX ORDER — ALBUTEROL SULFATE 2.5 MG/3ML
2.5 SOLUTION RESPIRATORY (INHALATION) EVERY 6 HOURS PRN
Status: DISCONTINUED | OUTPATIENT
Start: 2018-05-04 | End: 2018-05-04

## 2018-05-04 RX ORDER — AMITRIPTYLINE HYDROCHLORIDE 10 MG/1
TABLET, FILM COATED ORAL
Qty: 180 TABLET | Refills: 1 | Status: SHIPPED | OUTPATIENT
Start: 2018-05-04 | End: 2018-11-16 | Stop reason: ALTCHOICE

## 2018-05-04 RX ORDER — CETIRIZINE HYDROCHLORIDE 10 MG/1
10 TABLET ORAL DAILY
COMMUNITY
End: 2018-11-16 | Stop reason: SDUPTHER

## 2018-05-04 RX ADMIN — IOPAMIDOL 95 ML: 755 INJECTION, SOLUTION INTRAVENOUS at 15:07

## 2018-05-04 RX ADMIN — ALBUTEROL SULFATE 2.5 MG: 2.5 SOLUTION RESPIRATORY (INHALATION) at 12:33

## 2018-05-04 NOTE — PROGRESS NOTES
Problem List Items Addressed This Visit        Respiratory    Airway hyperreactivity - Primary      Other Visit Diagnoses     Palpitations        Relevant Orders    ECG 12 Lead    Shortness of breath        Relevant Orders    CT Chest Pulmonary Embolism With Contrast             Return Dependent on test results.  If CT negative she will need pulmonology f/u. Her PFTs were remarkable for ?restrictive disease (but got better with albuterol).   Denisa Holt is a 52 y.o. female being seen in our office today for Chest Pain                 She  reports that she has quit smoking. She started smoking about a year ago. She has never used smokeless tobacco. She reports that she drinks alcohol. Drug use questions deferred to the physician.             HPI   She has been having some palpitations on occasion but that is separate from her SOA. This has been going on for about three weeks. She has been using the advair regularly, sometimes two puffs. She quit smoking one year ago. She does not have an albuterol inhaler. She does not hear wheezing. She does have some hearing issues. More SOA with activity. SOA is progressing. She did not admit to chest pain to me.         The following portions of the patient's history were reviewed and updated as appropriate:PMHroutine: Social history , Allergies, Current Medications, Active Problem List and Health Maintenance            Review of Systems   Constitutional: Negative for activity change, appetite change, chills, fatigue, fever and unexpected weight change.   HENT: Negative for congestion, ear pain, hearing loss, nosebleeds, rhinorrhea and sore throat.    Eyes: Negative for pain, redness and visual disturbance.   Respiratory: Positive for shortness of breath. Negative for cough and wheezing.    Cardiovascular: Positive for palpitations. Negative for chest pain and leg swelling.   Gastrointestinal: Negative for abdominal pain, blood in stool, constipation, diarrhea, nausea and  vomiting.   Endocrine: Negative for cold intolerance and heat intolerance.   Genitourinary: Negative for difficulty urinating, dysuria, frequency, hematuria, pelvic pain, urgency and vaginal discharge.   Musculoskeletal: Negative for arthralgias, back pain and joint swelling.   Skin: Negative for rash and wound.   Neurological: Negative for dizziness, weakness, numbness and headaches.   Hematological: Does not bruise/bleed easily.   Psychiatric/Behavioral: Negative for dysphoric mood, sleep disturbance and suicidal ideas. The patient is not nervous/anxious.                  BP Readings from Last 1 Encounters:   05/04/18 98/62     Wt Readings from Last 3 Encounters:   05/04/18 70.3 kg (155 lb)   12/04/17 67.6 kg (149 lb)   10/20/17 67.5 kg (148 lb 12.8 oz)   Body mass index is 25.79 kg/m².                 Physical Exam   Constitutional: She is oriented to person, place, and time. Vital signs are normal. She appears well-developed and well-nourished. No distress.   HENT:   Head: Normocephalic.   Cardiovascular: Normal rate, regular rhythm and normal heart sounds.    Pulmonary/Chest: Effort normal and breath sounds normal.   Neurological: She is alert and oriented to person, place, and time. Gait normal.   Psychiatric: She has a normal mood and affect. Her behavior is normal. Judgment and thought content normal.   Vitals reviewed.                 ECG 12 Lead  Date/Time: 5/4/2018 1:10 PM  Performed by: RON MCGREGOR  Authorized by: RON MCGREGOR   Comparison: not compared with previous ECG   Previous ECG: no previous ECG available  Rhythm: sinus rhythm  Rate: normal  Conduction: conduction normal  ST Segments: ST segments normal  T Waves: T waves normal  QRS axis: normal  Other: no other findings  Clinical impression: normal ECG          PFT TEST    Patient Name: Denisa Holt  YOB: 1965  Medical Record Number:  [id number]    Date/time of exam: 5/4/2018    Interpretation:  The patient is a 52 y.o.  female.  There was excellent patient effort and cooperation.  The results of this test does meet the ATS standards for acceptability and repeatability. Albuterol Nebulizer was given before post test    Pre-Bronchodilator Findings:  FVC and FEV1 were 77% and 77% of predicted respectively.  FEV1/FVC% was 99.  AIN89-55 was 73% of predicted.    Spirometry  computer read as restrictive -- she improved FEF 25-75 and FVC with albuterol    Bronchodilator  Following the inhalation of a bronchodilator, there is significant improvement in airway mechanics.    See scanned file  Reviewed by: Aleja Robert MD  Date: 5/4/2018

## 2018-05-11 ENCOUNTER — OFFICE VISIT (OUTPATIENT)
Dept: ENDOCRINOLOGY | Age: 53
End: 2018-05-11

## 2018-05-11 VITALS
HEIGHT: 65 IN | DIASTOLIC BLOOD PRESSURE: 70 MMHG | WEIGHT: 156.4 LBS | BODY MASS INDEX: 26.06 KG/M2 | SYSTOLIC BLOOD PRESSURE: 114 MMHG | RESPIRATION RATE: 16 BRPM

## 2018-05-11 DIAGNOSIS — E55.9 VITAMIN D DEFICIENCY: ICD-10-CM

## 2018-05-11 DIAGNOSIS — E89.0 H/O THYROIDECTOMY: ICD-10-CM

## 2018-05-11 DIAGNOSIS — E89.0 POSTOPERATIVE HYPOTHYROIDISM: Primary | ICD-10-CM

## 2018-05-11 DIAGNOSIS — E03.9 ACQUIRED HYPOTHYROIDISM: ICD-10-CM

## 2018-05-11 DIAGNOSIS — E04.0 SIMPLE GOITER: ICD-10-CM

## 2018-05-11 DIAGNOSIS — M81.0 OSTEOPOROSIS, UNSPECIFIED OSTEOPOROSIS TYPE, UNSPECIFIED PATHOLOGICAL FRACTURE PRESENCE: ICD-10-CM

## 2018-05-11 DIAGNOSIS — N95.1 MENOPAUSAL SYMPTOM: ICD-10-CM

## 2018-05-11 DIAGNOSIS — E78.2 MIXED DYSLIPIDEMIA: ICD-10-CM

## 2018-05-11 PROCEDURE — 99214 OFFICE O/P EST MOD 30 MIN: CPT | Performed by: INTERNAL MEDICINE

## 2018-05-11 RX ORDER — ERGOCALCIFEROL 1.25 MG/1
CAPSULE ORAL
Qty: 26 CAPSULE | Refills: 3 | Status: SHIPPED | OUTPATIENT
Start: 2018-05-11 | End: 2018-11-16 | Stop reason: SDUPTHER

## 2018-05-11 RX ORDER — LEVOTHYROXINE SODIUM 175 UG/1
175 TABLET ORAL DAILY
Qty: 90 TABLET | Refills: 3 | Status: SHIPPED | OUTPATIENT
Start: 2018-05-11 | End: 2018-11-16

## 2018-05-11 RX ORDER — ROSUVASTATIN CALCIUM 20 MG/1
20 TABLET, COATED ORAL NIGHTLY
Qty: 90 TABLET | Refills: 3 | Status: SHIPPED | OUTPATIENT
Start: 2018-05-11 | End: 2018-11-16 | Stop reason: SDUPTHER

## 2018-05-11 NOTE — PROGRESS NOTES
"Subjective   Denisa Holt is a 52 y.o. female seen for follow up for hypothyroidism, vit d deficiency, lab review. Patient denies any problems or concerns.     History of Present Illness is a 52-year-old female known patient with thyroid nodules status post thyroidectomy and consequent hypothyroidism as well as vitamin D deficiency and osteoporosis.  Over the course lab last 6 months she has had no significant health problems for which to go to the emergency room or hospital.  /70   Resp 16   Ht 165.1 cm (65\")   Wt 70.9 kg (156 lb 6.4 oz)   BMI 26.03 kg/m²   No Known Allergies    Current Outpatient Prescriptions:   •  ADVAIR DISKUS 500-50 MCG/DOSE DISKUS, INHALE 1 PUFF 2 (TWO) TIMES A DAY., Disp: 60 each, Rfl: 5  •  amitriptyline (ELAVIL) 10 MG tablet, 1-2 tabs at hs for sleep, Disp: 180 tablet, Rfl: 1  •  cetirizine (zyrTEC) 10 MG tablet, Take 10 mg by mouth Daily., Disp: , Rfl:   •  LEVOXYL 175 MCG tablet, Take 1 tablet by mouth Daily., Disp: 120 tablet, Rfl: 0  •  vitamin D (ERGOCALCIFEROL) 93899 units capsule capsule, Take one capsule by mouth twice a week, Disp: 26 capsule, Rfl: 2      The following portions of the patient's history were reviewed and updated as appropriate: allergies, current medications, past family history, past medical history, past social history, past surgical history and problem list.    Review of Systems   Constitutional: Negative.    HENT: Negative.    Eyes: Negative.    Respiratory: Negative.    Cardiovascular: Negative.    Gastrointestinal: Negative.    Endocrine: Negative.    Genitourinary: Negative.    Musculoskeletal: Negative.    Skin: Negative.    Allergic/Immunologic: Negative.    Neurological: Negative.    Hematological: Negative.    Psychiatric/Behavioral: Negative.        Objective   Physical Exam   Constitutional: She is oriented to person, place, and time. She appears well-developed and well-nourished. No distress.   HENT:   Head: Normocephalic and atraumatic. "   Right Ear: External ear normal.   Left Ear: External ear normal.   Nose: Nose normal.   Mouth/Throat: Oropharynx is clear and moist. No oropharyngeal exudate.   Eyes: Conjunctivae and EOM are normal. Pupils are equal, round, and reactive to light. Right eye exhibits no discharge. Left eye exhibits no discharge. No scleral icterus.   Neck: Normal range of motion. Neck supple. No JVD present. No tracheal deviation present. No thyromegaly present.   Healed the scar of thyroidectomy in lower neck.   Cardiovascular: Normal rate, regular rhythm, normal heart sounds and intact distal pulses.  Exam reveals no gallop and no friction rub.    No murmur heard.  Pulmonary/Chest: Effort normal and breath sounds normal. No stridor. No respiratory distress. She has no wheezes. She has no rales. She exhibits no tenderness.   Abdominal: Soft. Bowel sounds are normal. She exhibits no distension and no mass. There is no tenderness. There is no rebound and no guarding. No hernia.   Musculoskeletal: Normal range of motion. She exhibits no edema, tenderness or deformity.   Lymphadenopathy:     She has no cervical adenopathy.   Neurological: She is alert and oriented to person, place, and time. She has normal reflexes. She displays normal reflexes. No cranial nerve deficit. She exhibits normal muscle tone. Coordination normal.   Skin: Skin is warm and dry. No rash noted. She is not diaphoretic. No erythema. No pallor.   Psychiatric: She has a normal mood and affect. Her behavior is normal. Judgment and thought content normal.   Nursing note and vitals reviewed.    Results for orders placed or performed during the hospital encounter of 05/04/18   POC Creatinine   Result Value Ref Range    Creatinine 0.60 0.60 - 1.30 mg/dL     Lab Results   Component Value Date    BUN 15 04/26/2018    CREATININE 0.60 05/04/2018    EGFRIFNONA 86 04/26/2018    EGFRIFAFRI 104 04/26/2018    BCR 21.1 04/26/2018    K 4.2 04/26/2018    CO2 24.4 04/26/2018     CALCIUM 9.8 04/26/2018    PROTENTOTREF 7.4 04/26/2018    ALBUMIN 4.60 04/26/2018    LABIL2 1.6 04/26/2018    AST 23 04/26/2018    ALT 19 04/26/2018     Lab Results   Component Value Date    TSH 0.325 04/26/2018     Lab Results   Component Value Date    CHLPL 225 (H) 04/26/2018    CHLPL 190 10/20/2017    CHLPL 201 (H) 08/26/2016     Lab Results   Component Value Date    TRIG 92 04/26/2018    TRIG 84 10/20/2017    TRIG 190 (H) 08/26/2016     Lab Results   Component Value Date    HDL 65 (H) 04/26/2018    HDL 72 (H) 10/20/2017    HDL 49 08/26/2016     Lab Results   Component Value Date     (H) 04/26/2018     (H) 10/20/2017     (H) 08/26/2016         Assessment/Plan   Diagnoses and all orders for this visit:    Postoperative hypothyroidism  -     T4 & TSH ([facility]); Future  -     T3, Free; Future  -     T4, Free; Future  -     Comprehensive Thyroglobulin; Future  -     Comprehensive Metabolic Panel; Future  -     Uric Acid; Future  -     Vitamin D 25 Hydroxy; Future  -     Hemoglobin A1c; Future  -     C-Peptide; Future    Vitamin D deficiency  -     LEVOXYL 175 MCG tablet; Take 1 tablet by mouth Daily.  -     vitamin D (ERGOCALCIFEROL) 73233 units capsule capsule; Take one capsule by mouth twice a week  -     T4 & TSH ([facility]); Future  -     T3, Free; Future  -     T4, Free; Future  -     Comprehensive Thyroglobulin; Future  -     Comprehensive Metabolic Panel; Future  -     Uric Acid; Future  -     Vitamin D 25 Hydroxy; Future  -     Hemoglobin A1c; Future  -     C-Peptide; Future    Osteoporosis, unspecified osteoporosis type, unspecified pathological fracture presence  -     T4 & TSH ([facility]); Future  -     T3, Free; Future  -     T4, Free; Future  -     Comprehensive Thyroglobulin; Future  -     Comprehensive Metabolic Panel; Future  -     Uric Acid; Future  -     Vitamin D 25 Hydroxy; Future  -     Hemoglobin A1c; Future  -     C-Peptide; Future    Menopausal symptom  -     T4 & TSH  ([facility]); Future  -     T3, Free; Future  -     T4, Free; Future  -     Comprehensive Thyroglobulin; Future  -     Comprehensive Metabolic Panel; Future  -     Uric Acid; Future  -     Vitamin D 25 Hydroxy; Future  -     Hemoglobin A1c; Future  -     C-Peptide; Future    Acquired hypothyroidism  -     LEVOXYL 175 MCG tablet; Take 1 tablet by mouth Daily.  -     vitamin D (ERGOCALCIFEROL) 15237 units capsule capsule; Take one capsule by mouth twice a week  -     T4 & TSH ([facility]); Future  -     T3, Free; Future  -     T4, Free; Future  -     Comprehensive Thyroglobulin; Future  -     Comprehensive Metabolic Panel; Future  -     Uric Acid; Future  -     Vitamin D 25 Hydroxy; Future  -     Hemoglobin A1c; Future  -     C-Peptide; Future    Simple goiter  -     LEVOXYL 175 MCG tablet; Take 1 tablet by mouth Daily.  -     vitamin D (ERGOCALCIFEROL) 69537 units capsule capsule; Take one capsule by mouth twice a week  -     T4 & TSH ([facility]); Future  -     T3, Free; Future  -     T4, Free; Future  -     Comprehensive Thyroglobulin; Future  -     Comprehensive Metabolic Panel; Future  -     Uric Acid; Future  -     Vitamin D 25 Hydroxy; Future  -     Hemoglobin A1c; Future  -     C-Peptide; Future    H/O thyroidectomy  -     LEVOXYL 175 MCG tablet; Take 1 tablet by mouth Daily.  -     vitamin D (ERGOCALCIFEROL) 73419 units capsule capsule; Take one capsule by mouth twice a week  -     T4 & TSH ([facility]); Future  -     T3, Free; Future  -     T4, Free; Future  -     Comprehensive Thyroglobulin; Future  -     Comprehensive Metabolic Panel; Future  -     Uric Acid; Future  -     Vitamin D 25 Hydroxy; Future  -     Hemoglobin A1c; Future  -     C-Peptide; Future    Mixed dyslipidemia  -     T4 & TSH ([facility]); Future  -     T3, Free; Future  -     T4, Free; Future  -     Comprehensive Thyroglobulin; Future  -     Comprehensive Metabolic Panel; Future  -     Uric Acid; Future  -     Vitamin D 25 Hydroxy; Future  -      Hemoglobin A1c; Future  -     C-Peptide; Future    Other orders  -     rosuvastatin (CRESTOR) 20 MG tablet; Take 1 tablet by mouth Every Night.             his summary I saw and examined this 52-year-old female for above-mentioned problems.  I reviewed her laboratory evaluations of 04/26/2017 and provided him with a hard copy of it.  Overall she is clinically and metabolically stable and therefore we will go ahead and continue all her current prescriptions.  She has a strong family history of coronary and peripheral arterial disease and therefore the cause of her elevated levels of total cholesterol and LDL we will go ahead and start her on Crestor 20 mg daily.  We will see her in 6 months or sooner if needed with laboratory evaluation prior to that office visit.

## 2018-08-29 DIAGNOSIS — Z12.11 ENCOUNTER FOR SCREENING FOR MALIGNANT NEOPLASM OF COLON: Primary | ICD-10-CM

## 2018-08-29 RX ORDER — SODIUM CHLORIDE, SODIUM LACTATE, POTASSIUM CHLORIDE, CALCIUM CHLORIDE 600; 310; 30; 20 MG/100ML; MG/100ML; MG/100ML; MG/100ML
30 INJECTION, SOLUTION INTRAVENOUS CONTINUOUS
Status: CANCELLED | OUTPATIENT
Start: 2018-12-18

## 2018-09-12 DIAGNOSIS — M81.0 OSTEOPOROSIS, UNSPECIFIED OSTEOPOROSIS TYPE, UNSPECIFIED PATHOLOGICAL FRACTURE PRESENCE: Primary | ICD-10-CM

## 2018-10-17 DIAGNOSIS — E55.9 VITAMIN D DEFICIENCY: Primary | ICD-10-CM

## 2018-10-17 DIAGNOSIS — E89.0 POSTOPERATIVE HYPOTHYROIDISM: ICD-10-CM

## 2018-10-29 ENCOUNTER — HOSPITAL ENCOUNTER (OUTPATIENT)
Dept: BONE DENSITY | Facility: HOSPITAL | Age: 53
Discharge: HOME | End: 2018-10-29
Attending: INTERNAL MEDICINE | Admitting: INTERNAL MEDICINE

## 2018-10-29 PROCEDURE — 77080 DXA BONE DENSITY AXIAL: CPT

## 2018-10-31 ENCOUNTER — RESULTS ENCOUNTER (OUTPATIENT)
Dept: ENDOCRINOLOGY | Age: 53
End: 2018-10-31

## 2018-10-31 DIAGNOSIS — E89.0 POSTOPERATIVE HYPOTHYROIDISM: ICD-10-CM

## 2018-10-31 DIAGNOSIS — E04.0 SIMPLE GOITER: ICD-10-CM

## 2018-10-31 DIAGNOSIS — E55.9 VITAMIN D DEFICIENCY: ICD-10-CM

## 2018-10-31 DIAGNOSIS — E03.9 ACQUIRED HYPOTHYROIDISM: ICD-10-CM

## 2018-10-31 DIAGNOSIS — M81.0 OSTEOPOROSIS, UNSPECIFIED OSTEOPOROSIS TYPE, UNSPECIFIED PATHOLOGICAL FRACTURE PRESENCE: ICD-10-CM

## 2018-10-31 DIAGNOSIS — E89.0 H/O THYROIDECTOMY: ICD-10-CM

## 2018-10-31 DIAGNOSIS — N95.1 MENOPAUSAL SYMPTOM: ICD-10-CM

## 2018-10-31 DIAGNOSIS — E78.2 MIXED DYSLIPIDEMIA: ICD-10-CM

## 2018-11-02 RX ORDER — RALOXIFENE HYDROCHLORIDE 60 MG/1
60 TABLET, FILM COATED ORAL DAILY
Qty: 30 TABLET | Refills: 11 | Status: SHIPPED | OUTPATIENT
Start: 2018-11-02 | End: 2018-12-07

## 2018-11-05 ENCOUNTER — LAB (OUTPATIENT)
Dept: ENDOCRINOLOGY | Age: 53
End: 2018-11-05

## 2018-11-05 DIAGNOSIS — E55.9 VITAMIN D DEFICIENCY: ICD-10-CM

## 2018-11-05 DIAGNOSIS — E89.0 POSTOPERATIVE HYPOTHYROIDISM: ICD-10-CM

## 2018-11-07 LAB
25(OH)D3+25(OH)D2 SERPL-MCNC: 72.3 NG/ML (ref 30–100)
ALBUMIN SERPL-MCNC: 4.3 G/DL (ref 3.5–5.2)
ALBUMIN/GLOB SERPL: 1.6 G/DL
ALP SERPL-CCNC: 67 U/L (ref 39–117)
ALT SERPL-CCNC: 29 U/L (ref 1–33)
AST SERPL-CCNC: 31 U/L (ref 1–32)
BILIRUB SERPL-MCNC: 0.3 MG/DL (ref 0.1–1.2)
BUN SERPL-MCNC: 10 MG/DL (ref 6–20)
BUN/CREAT SERPL: 17.9 (ref 7–25)
CALCIUM SERPL-MCNC: 9.6 MG/DL (ref 8.6–10.5)
CHLORIDE SERPL-SCNC: 103 MMOL/L (ref 98–107)
CHOLEST SERPL-MCNC: 125 MG/DL (ref 0–200)
CO2 SERPL-SCNC: 26.1 MMOL/L (ref 22–29)
CREAT SERPL-MCNC: 0.56 MG/DL (ref 0.57–1)
FT4I SERPL CALC-MCNC: 3.8 (ref 1.2–4.9)
GLOBULIN SER CALC-MCNC: 2.7 GM/DL
GLUCOSE SERPL-MCNC: 86 MG/DL (ref 65–99)
HDLC SERPL-MCNC: 57 MG/DL (ref 40–60)
INTERPRETATION: NORMAL
LDLC SERPL CALC-MCNC: 51 MG/DL (ref 0–100)
POTASSIUM SERPL-SCNC: 4 MMOL/L (ref 3.5–5.2)
PROT SERPL-MCNC: 7 G/DL (ref 6–8.5)
SODIUM SERPL-SCNC: 141 MMOL/L (ref 136–145)
T3FREE SERPL-MCNC: 3.2 PG/ML (ref 2–4.4)
T3RU NFR SERPL: 31 % (ref 24–39)
T4 FREE SERPL-MCNC: 2.14 NG/DL (ref 0.93–1.7)
T4 SERPL-MCNC: 12.3 UG/DL (ref 4.5–12)
THYROGLOB AB SERPL-ACNC: <1 IU/ML
THYROGLOB SERPL-MCNC: 0.4 NG/ML
THYROGLOB SERPL-MCNC: NORMAL NG/ML
TRIGL SERPL-MCNC: 83 MG/DL (ref 0–150)
TSH SERPL DL<=0.005 MIU/L-ACNC: 0.03 UIU/ML (ref 0.45–4.5)
VLDLC SERPL CALC-MCNC: 16.6 MG/DL (ref 5–40)

## 2018-11-13 PROBLEM — Z12.11 ENCOUNTER FOR SCREENING FOR MALIGNANT NEOPLASM OF COLON: Status: ACTIVE | Noted: 2018-11-13

## 2018-11-14 ENCOUNTER — TELEPHONE (OUTPATIENT)
Dept: ENDOCRINOLOGY | Age: 53
End: 2018-11-14

## 2018-11-14 NOTE — TELEPHONE ENCOUNTER
----- Message from Roxana Nascimento sent at 11/13/2018  4:29 PM EST -----  Contact: [facility] / Mr Flowers   Medication: Prolia  Message: Mr Flowers from [facility] has called in regards to the above medication. He is stating, they need confirmation is the patient is still currently going to be taking this medication. Patient has informed [facility]  that she was not.  However, the pharmacy need direct conformation from our office in regards to this.     Please call [facility] back directly in regards to this issue   [phone]      Spoke to Sandrine with [facility] and gave verbal that Prolia has been cancelled.

## 2018-11-16 ENCOUNTER — OFFICE VISIT (OUTPATIENT)
Dept: ENDOCRINOLOGY | Age: 53
End: 2018-11-16

## 2018-11-16 VITALS
DIASTOLIC BLOOD PRESSURE: 76 MMHG | WEIGHT: 150 LBS | BODY MASS INDEX: 24.99 KG/M2 | HEIGHT: 65 IN | SYSTOLIC BLOOD PRESSURE: 92 MMHG

## 2018-11-16 DIAGNOSIS — E04.0 SIMPLE GOITER: ICD-10-CM

## 2018-11-16 DIAGNOSIS — N95.1 MENOPAUSAL SYMPTOM: ICD-10-CM

## 2018-11-16 DIAGNOSIS — E04.1 THYROID NODULE: ICD-10-CM

## 2018-11-16 DIAGNOSIS — E89.0 POSTOPERATIVE HYPOTHYROIDISM: Primary | ICD-10-CM

## 2018-11-16 DIAGNOSIS — E55.9 VITAMIN D DEFICIENCY: ICD-10-CM

## 2018-11-16 DIAGNOSIS — E89.0 H/O THYROIDECTOMY: ICD-10-CM

## 2018-11-16 DIAGNOSIS — E78.2 MIXED DYSLIPIDEMIA: ICD-10-CM

## 2018-11-16 DIAGNOSIS — E03.9 ACQUIRED HYPOTHYROIDISM: ICD-10-CM

## 2018-11-16 PROCEDURE — 99214 OFFICE O/P EST MOD 30 MIN: CPT | Performed by: NURSE PRACTITIONER

## 2018-11-16 RX ORDER — ROSUVASTATIN CALCIUM 20 MG/1
20 TABLET, COATED ORAL NIGHTLY
Qty: 90 TABLET | Refills: 3 | Status: SHIPPED | OUTPATIENT
Start: 2018-11-16 | End: 2019-06-10 | Stop reason: SDUPTHER

## 2018-11-16 RX ORDER — ERGOCALCIFEROL 1.25 MG/1
CAPSULE ORAL
Qty: 26 CAPSULE | Refills: 3 | Status: SHIPPED | OUTPATIENT
Start: 2018-11-16 | End: 2019-06-10 | Stop reason: SDUPTHER

## 2018-11-16 RX ORDER — LEVOTHYROXINE SODIUM 150 UG/1
150 TABLET ORAL DAILY
Qty: 90 TABLET | Refills: 1 | Status: SHIPPED | OUTPATIENT
Start: 2018-11-16 | End: 2019-03-18 | Stop reason: SDUPTHER

## 2018-11-16 RX ORDER — CETIRIZINE HYDROCHLORIDE 10 MG/1
10 TABLET ORAL DAILY
Qty: 90 TABLET | Refills: 3 | Status: SHIPPED | OUTPATIENT
Start: 2018-11-16 | End: 2019-09-11 | Stop reason: SDUPTHER

## 2018-11-16 NOTE — PROGRESS NOTES
"Darleen Holt is a 53 y.o. female is here today for follow-up.  Chief Complaint   Patient presents with   • Hypothyroidism     Recent labs   • Vitamin D Deficiency   • Hyperlipidemia   • Goiter     BP 92/76   Ht 165.1 cm (65\")   Wt 68 kg (150 lb)   BMI 24.96 kg/m²   Current Outpatient Medications on File Prior to Visit   Medication Sig   • cetirizine (zyrTEC) 10 MG tablet Take 10 mg by mouth Daily.   • LEVOXYL 175 MCG tablet Take 1 tablet by mouth Daily.   • raloxifene (EVISTA) 60 MG tablet Take 1 tablet by mouth Daily.   • rosuvastatin (CRESTOR) 20 MG tablet Take 1 tablet by mouth Every Night.   • vitamin D (ERGOCALCIFEROL) 06498 units capsule capsule Take one capsule by mouth twice a week   • [DISCONTINUED] ADVAIR DISKUS 500-50 MCG/DOSE DISKUS INHALE 1 PUFF 2 (TWO) TIMES A DAY.   • [DISCONTINUED] amitriptyline (ELAVIL) 10 MG tablet 1-2 tabs at hs for sleep   • [DISCONTINUED] tazarotene (TAZORAC) 0.05 % cream Apply  topically Every Night.     No current facility-administered medications on file prior to visit.      Family History   Problem Relation Age of Onset   • Hypertension Mother          82   • Stroke Father          72   • Heart attack Father      Social History     Tobacco Use   • Smoking status: Former Smoker     Start date: 2017   • Smokeless tobacco: Never Used   Substance Use Topics   • Alcohol use: Yes     Comment: once a month or so   • Drug use: Defer     No Known Allergies      History of Present Illness  Encounter Diagnoses   Name Primary?   • Vitamin D deficiency    • Postoperative hypothyroidism Yes   • Simple goiter    • Thyroid nodule    • Menopausal symptom    • H/O thyroidectomy    • Mixed dyslipidemia      53-year-old female patient here today for routine follow-up visit.  She is being seen for the above-mentioned problems.  She has a history of a thyroidectomy.  She is taking her thyroid hormone name brand Levoxyl 175 µg daily.  She is waking up in the mornings " with palpitations and does noticed tremors on occasion.  She is currently not on any hormone replacement therapy for menopausal symptoms.  We discussed decreasing her Levoxyl dose to see if this helps improve her symptoms.  She has no other complaints at today's visit.  She has been successful with weight loss.  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  She has lost 6 pounds since her last visit.  The following portions of the patient's history were reviewed and updated as appropriate: allergies, current medications, past family history, past medical history, past social history, past surgical history and problem list.    Review of Systems   Constitutional: Negative for fatigue.   HENT: Negative for trouble swallowing.    Eyes: Negative for visual disturbance.   Respiratory: Negative for shortness of breath.    Cardiovascular: Negative for leg swelling.   Endocrine: Negative for polyuria.   Skin: Negative for wound.   Neurological: Negative for numbness.       Objective   Physical Exam   Constitutional: She is oriented to person, place, and time. She appears well-developed and well-nourished. No distress.   HENT:   Head: Normocephalic and atraumatic.   Right Ear: External ear normal.   Left Ear: External ear normal.   Nose: Nose normal.   Mouth/Throat: Oropharynx is clear and moist. No oropharyngeal exudate.   Eyes: Conjunctivae and EOM are normal. Pupils are equal, round, and reactive to light. Right eye exhibits no discharge. Left eye exhibits no discharge. No scleral icterus.   Neck: Normal range of motion. Neck supple. No JVD present. No tracheal deviation present. No thyromegaly present.   Healed the scar of thyroidectomy in lower neck.   Cardiovascular: Normal rate, regular rhythm, normal heart sounds and intact distal pulses. Exam reveals no gallop and no friction rub.   No murmur heard.  Pulmonary/Chest: Effort normal and breath sounds normal. No stridor. No respiratory distress. She  has no wheezes. She has no rales. She exhibits no tenderness.   Abdominal: Soft. Bowel sounds are normal. She exhibits no distension and no mass. There is no tenderness. There is no rebound and no guarding. No hernia.   Musculoskeletal: Normal range of motion. She exhibits no edema, tenderness or deformity.   Lymphadenopathy:     She has no cervical adenopathy.   Neurological: She is alert and oriented to person, place, and time. She has normal reflexes. She displays normal reflexes. No cranial nerve deficit. She exhibits normal muscle tone. Coordination normal.   Skin: Skin is warm and dry. No rash noted. She is not diaphoretic. No erythema. No pallor.   Psychiatric: She has a normal mood and affect. Her behavior is normal. Judgment and thought content normal.   Nursing note and vitals reviewed.    Results for orders placed or performed in visit on 11/05/18   Comprehensive Metabolic Panel   Result Value Ref Range    Glucose 86 65 - 99 mg/dL    BUN 10 6 - 20 mg/dL    Creatinine 0.56 (L) 0.57 - 1.00 mg/dL    eGFR Non African Am 112 >60 mL/min/1.73    eGFR African Am 136 >60 mL/min/1.73    BUN/Creatinine Ratio 17.9 7.0 - 25.0    Sodium 141 136 - 145 mmol/L    Potassium 4.0 3.5 - 5.2 mmol/L    Chloride 103 98 - 107 mmol/L    Total CO2 26.1 22.0 - 29.0 mmol/L    Calcium 9.6 8.6 - 10.5 mg/dL    Total Protein 7.0 6.0 - 8.5 g/dL    Albumin 4.30 3.50 - 5.20 g/dL    Globulin 2.7 gm/dL    A/G Ratio 1.6 g/dL    Total Bilirubin 0.3 0.1 - 1.2 mg/dL    Alkaline Phosphatase 67 39 - 117 U/L    AST (SGOT) 31 1 - 32 U/L    ALT (SGPT) 29 1 - 33 U/L   Comprehensive Thyroglobulin   Result Value Ref Range    Thyroglobulin Ab <1.0 IU/mL    Thyroglobulin 0.4 ng/mL    Thyroglobulin (TG-INDER) Comment ng/mL   Lipid Panel   Result Value Ref Range    Total Cholesterol 125 0 - 200 mg/dL    Triglycerides 83 0 - 150 mg/dL    HDL Cholesterol 57 40 - 60 mg/dL    VLDL Cholesterol 16.6 5 - 40 mg/dL    LDL Cholesterol  51 0 - 100 mg/dL   Vitamin D 25  Hydroxy   Result Value Ref Range    25 Hydroxy, Vitamin D 72.3 30.0 - 100.0 ng/ml   Thyroid Panel With TSH   Result Value Ref Range    TSH 0.027 (L) 0.450 - 4.500 uIU/mL    T4, Total 12.3 (H) 4.5 - 12.0 ug/dL    T3 Uptake 31 24 - 39 %    Free Thyroxine Index 3.8 1.2 - 4.9   T4, Free   Result Value Ref Range    Free T4 2.14 (H) 0.93 - 1.70 ng/dL   T3, Free   Result Value Ref Range    T3, Free 3.2 2.0 - 4.4 pg/mL   Cardiovascular Risk Assessment   Result Value Ref Range    Interpretation Note        Assessment/Plan   Problems Addressed this Visit        Digestive    Vitamin D deficiency       Endocrine    Hypothyroidism - Primary    Simple goiter    Thyroid nodule       Genitourinary    Menopausal symptom       Other    H/O thyroidectomy    Mixed dyslipidemia        Summary, patient was seen and examined.  Metabolically she is stable.  We will decrease her Levoxyl dose to 150 µg daily to see if this helps improve her tremors and palpitations.  She is not on menopausal therapy for hot flashes.  At this time she does not want to take any type of hormone replacement therapy.  She does have osteoporosis and is currently on evista.  Her cholesterol has improved significantly on Crestor 20 mg.  Her vitamin D is stable.  She will follow-up with Dr. Bowman in 6 months with labs.  I've encouraged her to contact the office should she have any questions or concerns.  Her medication refills were sent to her pharmacy for 90 days per her request

## 2018-12-07 RX ORDER — ALENDRONATE SODIUM 70 MG/75ML
70 SOLUTION ORAL
Qty: 13 ML | Refills: 3 | Status: SHIPPED | OUTPATIENT
Start: 2018-12-07 | End: 2018-12-11 | Stop reason: SDUPTHER

## 2018-12-11 RX ORDER — ALENDRONATE SODIUM 70 MG/75ML
70 SOLUTION ORAL
Qty: 13 ML | Refills: 3 | Status: SHIPPED | OUTPATIENT
Start: 2018-12-11 | End: 2018-12-17

## 2018-12-12 ENCOUNTER — TELEPHONE (OUTPATIENT)
Dept: ENDOCRINOLOGY | Age: 53
End: 2018-12-12

## 2018-12-12 NOTE — TELEPHONE ENCOUNTER
----- Message from Kenya Bowman MD sent at 12/11/2018  4:23 PM EST -----  It is 13 pills with 3 refills the total of 52 which are the number of weeks in a year.  Essentially I am giving her one year supply.  ----- Message -----  From: Nydia Adams MA  Sent: 12/11/2018   9:33 AM  To: Kenya Bowman MD    Directions for Alendronate states to take 75ML every week but quantity is only for 13ML. Please send RX to [facility].     Dr. Bowman resent RX to [facility].

## 2018-12-17 RX ORDER — ALENDRONATE SODIUM 70 MG/1
70 TABLET ORAL
Qty: 12 TABLET | Refills: 3 | Status: SHIPPED | OUTPATIENT
Start: 2018-12-17 | End: 2019-06-10 | Stop reason: SDUPTHER

## 2018-12-18 ENCOUNTER — ANESTHESIA (OUTPATIENT)
Dept: GASTROENTEROLOGY | Facility: HOSPITAL | Age: 53
End: 2018-12-18

## 2018-12-18 ENCOUNTER — ANESTHESIA EVENT (OUTPATIENT)
Dept: GASTROENTEROLOGY | Facility: HOSPITAL | Age: 53
End: 2018-12-18

## 2018-12-18 ENCOUNTER — HOSPITAL ENCOUNTER (OUTPATIENT)
Facility: HOSPITAL | Age: 53
Setting detail: HOSPITAL OUTPATIENT SURGERY
Discharge: HOME | End: 2018-12-18
Attending: INTERNAL MEDICINE | Admitting: INTERNAL MEDICINE

## 2018-12-18 VITALS
OXYGEN SATURATION: 98 % | TEMPERATURE: 97.5 F | DIASTOLIC BLOOD PRESSURE: 65 MMHG | SYSTOLIC BLOOD PRESSURE: 94 MMHG | HEIGHT: 65 IN | BODY MASS INDEX: 24.66 KG/M2 | HEART RATE: 54 BPM | WEIGHT: 148 LBS | RESPIRATION RATE: 17 BRPM

## 2018-12-18 DIAGNOSIS — Z12.11 ENCOUNTER FOR SCREENING FOR MALIGNANT NEOPLASM OF COLON: ICD-10-CM

## 2018-12-18 PROCEDURE — 45380 COLONOSCOPY AND BIOPSY: CPT | Performed by: INTERNAL MEDICINE

## 2018-12-18 PROCEDURE — 25010000002 PROPOFOL 10 MG/ML EMULSION: Performed by: ANESTHESIOLOGY

## 2018-12-18 PROCEDURE — S0260 H&P FOR SURGERY: HCPCS | Performed by: INTERNAL MEDICINE

## 2018-12-18 PROCEDURE — 88305 TISSUE EXAM BY PATHOLOGIST: CPT | Performed by: INTERNAL MEDICINE

## 2018-12-18 RX ORDER — LIDOCAINE HYDROCHLORIDE 20 MG/ML
INJECTION, SOLUTION INFILTRATION; PERINEURAL AS NEEDED
Status: DISCONTINUED | OUTPATIENT
Start: 2018-12-18 | End: 2018-12-18 | Stop reason: SURG

## 2018-12-18 RX ORDER — PROPOFOL 10 MG/ML
VIAL (ML) INTRAVENOUS CONTINUOUS PRN
Status: DISCONTINUED | OUTPATIENT
Start: 2018-12-18 | End: 2018-12-18 | Stop reason: SURG

## 2018-12-18 RX ORDER — SODIUM CHLORIDE, SODIUM LACTATE, POTASSIUM CHLORIDE, CALCIUM CHLORIDE 600; 310; 30; 20 MG/100ML; MG/100ML; MG/100ML; MG/100ML
30 INJECTION, SOLUTION INTRAVENOUS CONTINUOUS
Status: DISCONTINUED | OUTPATIENT
Start: 2018-12-18 | End: 2018-12-18 | Stop reason: HOSPADM

## 2018-12-18 RX ORDER — PROPOFOL 10 MG/ML
VIAL (ML) INTRAVENOUS AS NEEDED
Status: DISCONTINUED | OUTPATIENT
Start: 2018-12-18 | End: 2018-12-18 | Stop reason: SURG

## 2018-12-18 RX ADMIN — SODIUM CHLORIDE, POTASSIUM CHLORIDE, SODIUM LACTATE AND CALCIUM CHLORIDE: 600; 310; 30; 20 INJECTION, SOLUTION INTRAVENOUS at 10:45

## 2018-12-18 RX ADMIN — SODIUM CHLORIDE, POTASSIUM CHLORIDE, SODIUM LACTATE AND CALCIUM CHLORIDE 30 ML/HR: 600; 310; 30; 20 INJECTION, SOLUTION INTRAVENOUS at 10:18

## 2018-12-18 RX ADMIN — PROPOFOL 100 MCG/KG/MIN: 10 INJECTION, EMULSION INTRAVENOUS at 10:45

## 2018-12-18 RX ADMIN — PROPOFOL 150 MG: 10 INJECTION, EMULSION INTRAVENOUS at 10:45

## 2018-12-18 RX ADMIN — LIDOCAINE HYDROCHLORIDE 20 MG: 20 INJECTION, SOLUTION INFILTRATION; PERINEURAL at 10:45

## 2018-12-18 NOTE — ANESTHESIA POSTPROCEDURE EVALUATION
Patient: Denisa Holt    Procedure Summary     Date:  12/18/18 Room / Location:  [facility] ENDOSCOPY 8 /  [facility]    Anesthesia Start:  1048 Anesthesia Stop:  1126    Procedure:  COLONOSCOPY TO CECUM WITH POLYPECTOMY (N/A ) Diagnosis:       Encounter for screening for malignant neoplasm of colon      (Encounter for screening for malignant neoplasm of colon [Z12.11])    Surgeon:  Delaney Gilman MD Provider:  Heidi Hallman MD    Anesthesia Type:  MAC ASA Status:  2          Anesthesia Type: MAC  Last vitals  BP   (!) 72/44 (12/18/18 1100)   Temp   36.4 °C (97.5 °F) (12/18/18 1010)   Pulse   55 (12/18/18 1010)   Resp   14 (12/18/18 1100)     SpO2   100 % (12/18/18 1100)     Post Anesthesia Care and Evaluation    Patient location during evaluation: PHASE II  Level of consciousness: awake  Anesthetic complications: No anesthetic complications

## 2018-12-18 NOTE — ANESTHESIA PREPROCEDURE EVALUATION
Anesthesia Evaluation     no history of anesthetic complications:               Airway   Mallampati: II  TM distance: >3 FB  Neck ROM: full  Dental      Comment: Cap in front    Pulmonary    (+) a smoker Former, asthma,   (-) recent URI  Cardiovascular     (+) dysrhythmias Tachycardia, hyperlipidemia,   (-) angina, CHF      Neuro/Psych  GI/Hepatic/Renal/Endo    (+)   hypothyroidism,     Musculoskeletal     Abdominal    Substance History      OB/GYN          Other                      Anesthesia Plan    ASA 2     MAC     intravenous induction   Anesthetic plan, all risks, benefits, and alternatives have been provided, discussed and informed consent has been obtained with: patient.

## 2018-12-18 NOTE — DISCHARGE INSTRUCTIONS
For the next 24 hours patient needs to be with a responsible adult.    For 24 hours DO NOT drive, operate machinery, appliances, drink alcohol, make important decisions or sign legal documents.    Start with a light or bland diet and advance to regular diet as tolerated.    Follow recommendations on procedure report provided by your doctor.    Call Dr Gilman for problems [phone]    Problems may include but not limited to: large amounts of bleeding, trouble breathing, repeated vomiting, severe unrelieved pain, fever or chills.

## 2018-12-18 NOTE — H&P
[facility]  Pre Procedure History & Physical    Chief Complaint: colon cancer screening      HPI: 54yo W with PMH as below here for screening colonoscopy.  No family history of GI malignancies nor colon polyps.  Denies blood in stool, change in bowel habits, abdominal pain.  Otherwise feels well.        Past Medical History:   Past Medical History:   Diagnosis Date   • Asthma    • Hypothyroidism    • Vitamin D deficiency        Family History:  Family History   Problem Relation Age of Onset   • Hypertension Mother          82   • Stroke Father          72   • Heart attack Father        Social History:   reports that she has quit smoking. She started smoking about 19 months ago. she has never used smokeless tobacco. She reports that she drinks alcohol. Drug use questions deferred to the physician.    Medications:   Medications Prior to Admission   Medication Sig Dispense Refill Last Dose   • alendronate (FOSAMAX) 70 MG tablet Take 1 tablet by mouth Every 7 (Seven) Days. 12 tablet 3    • cetirizine (zyrTEC) 10 MG tablet Take 1 tablet by mouth Daily. 90 tablet 3    • estradiol-norethindrone (COMBIPATCH) 0.05-0.14 MG/DAY patch Place 1 patch on the skin as directed by provider 2 (Two) Times a Week. 24 patch 3    • LEVOXYL 150 MCG tablet Take 1 tablet by mouth Daily. 90 tablet 1    • rosuvastatin (CRESTOR) 20 MG tablet Take 1 tablet by mouth Every Night. 90 tablet 3    • vitamin D (ERGOCALCIFEROL) 37704 units capsule capsule Take one capsule by mouth twice a week 26 capsule 3        Allergies:  Patient has no known allergies.    ROS:    Pertinent items are noted in HPI     Objective     There were no vitals taken for this visit.    Physical Exam   Constitutional: Pt is oriented to person, place, and time and well-developed, well-nourished, and in no distress.   HENT:   Mouth/Throat: Oropharynx is clear and moist.   Neck: Normal range of motion. Neck supple.   Cardiovascular: Normal rate,  regular rhythm and normal heart sounds.    Pulmonary/Chest: Effort normal and breath sounds normal. No respiratory distress. No  wheezes.   Abdominal: Soft. Bowel sounds are normal.   Skin: Skin is warm and dry.   Psychiatric: Mood, memory, affect and judgment normal.     Assessment/Plan     Diagnosis: colon cancer screening      Anticipated Surgical Procedure:    Colonoscopy    The risks, benefits, and alternatives of this procedure have been discussed with the patient or the responsible party- the patient understands and agrees to proceed.

## 2018-12-19 LAB
LAB AP CASE REPORT: NORMAL
PATH REPORT.FINAL DX SPEC: NORMAL
PATH REPORT.GROSS SPEC: NORMAL

## 2018-12-19 NOTE — PROGRESS NOTES
The polyp removed from her cecum was benign.    Her next colonoscopy should be in 5 years.  Please place in recall.

## 2018-12-20 ENCOUNTER — TELEPHONE (OUTPATIENT)
Dept: GASTROENTEROLOGY | Facility: CLINIC | Age: 53
End: 2018-12-20

## 2018-12-20 NOTE — TELEPHONE ENCOUNTER
----- Message from Delaney Gilman MD sent at 12/19/2018  5:40 PM EST -----  The polyp removed from her cecum was benign.    Her next colonoscopy should be in 5 years.  Please place in recall.

## 2018-12-21 NOTE — TELEPHONE ENCOUNTER
Call from pt.  Advise per Dr Gilman that polyp removed from cecum was benign.  Next c/s should be in 5 yrs.  Pt verb understanding.

## 2019-01-02 DIAGNOSIS — E89.0 POSTOPERATIVE HYPOTHYROIDISM: Primary | ICD-10-CM

## 2019-01-02 DIAGNOSIS — N95.1 MENOPAUSAL SYMPTOM: ICD-10-CM

## 2019-01-02 DIAGNOSIS — M81.0 OSTEOPOROSIS, UNSPECIFIED OSTEOPOROSIS TYPE, UNSPECIFIED PATHOLOGICAL FRACTURE PRESENCE: ICD-10-CM

## 2019-01-02 DIAGNOSIS — E78.2 MIXED DYSLIPIDEMIA: ICD-10-CM

## 2019-01-02 DIAGNOSIS — E55.9 VITAMIN D DEFICIENCY: ICD-10-CM

## 2019-01-02 DIAGNOSIS — E89.0 H/O THYROIDECTOMY: ICD-10-CM

## 2019-01-30 ENCOUNTER — RESULTS ENCOUNTER (OUTPATIENT)
Dept: ENDOCRINOLOGY | Age: 54
End: 2019-01-30

## 2019-01-30 DIAGNOSIS — E89.0 POSTOPERATIVE HYPOTHYROIDISM: ICD-10-CM

## 2019-01-30 DIAGNOSIS — E55.9 VITAMIN D DEFICIENCY: ICD-10-CM

## 2019-01-30 DIAGNOSIS — E89.0 H/O THYROIDECTOMY: ICD-10-CM

## 2019-01-30 DIAGNOSIS — N95.1 MENOPAUSAL SYMPTOM: ICD-10-CM

## 2019-01-30 DIAGNOSIS — E78.2 MIXED DYSLIPIDEMIA: ICD-10-CM

## 2019-01-30 DIAGNOSIS — M81.0 OSTEOPOROSIS, UNSPECIFIED OSTEOPOROSIS TYPE, UNSPECIFIED PATHOLOGICAL FRACTURE PRESENCE: ICD-10-CM

## 2019-02-01 LAB
25(OH)D3+25(OH)D2 SERPL-MCNC: 87.3 NG/ML (ref 30–100)
ALBUMIN SERPL-MCNC: 4.4 G/DL (ref 3.5–5.2)
ALBUMIN/GLOB SERPL: 1.8 G/DL
ALP SERPL-CCNC: 53 U/L (ref 39–117)
ALT SERPL-CCNC: 26 U/L (ref 1–33)
AST SERPL-CCNC: 30 U/L (ref 1–32)
BILIRUB SERPL-MCNC: 0.2 MG/DL (ref 0.1–1.2)
BUN SERPL-MCNC: 16 MG/DL (ref 6–20)
BUN/CREAT SERPL: 24.6 (ref 7–25)
CALCIUM SERPL-MCNC: 9.4 MG/DL (ref 8.6–10.5)
CHLORIDE SERPL-SCNC: 103 MMOL/L (ref 98–107)
CHOLEST SERPL-MCNC: 143 MG/DL (ref 0–200)
CO2 SERPL-SCNC: 24.5 MMOL/L (ref 22–29)
CREAT SERPL-MCNC: 0.65 MG/DL (ref 0.57–1)
GLOBULIN SER CALC-MCNC: 2.5 GM/DL
GLUCOSE SERPL-MCNC: 110 MG/DL (ref 65–99)
HDLC SERPL-MCNC: 53 MG/DL (ref 40–60)
INTERPRETATION: NORMAL
LDLC SERPL CALC-MCNC: 69 MG/DL (ref 0–100)
POTASSIUM SERPL-SCNC: 4.3 MMOL/L (ref 3.5–5.2)
PROT SERPL-MCNC: 6.9 G/DL (ref 6–8.5)
SODIUM SERPL-SCNC: 141 MMOL/L (ref 136–145)
T3FREE SERPL-MCNC: 2.3 PG/ML (ref 2–4.4)
T4 FREE SERPL-MCNC: 3.78 NG/DL (ref 0.93–1.7)
T4 SERPL-MCNC: 10.4 MCG/DL (ref 4.5–11.7)
TRIGL SERPL-MCNC: 107 MG/DL (ref 0–150)
TSH SERPL DL<=0.005 MIU/L-ACNC: 1.13 MIU/ML (ref 0.27–4.2)
URATE SERPL-MCNC: 3.2 MG/DL (ref 2.4–5.7)
VLDLC SERPL CALC-MCNC: 21.4 MG/DL (ref 5–40)

## 2019-02-21 ENCOUNTER — OFFICE VISIT (OUTPATIENT)
Dept: ENDOCRINOLOGY | Age: 54
End: 2019-02-21

## 2019-02-21 VITALS
BODY MASS INDEX: 25.22 KG/M2 | SYSTOLIC BLOOD PRESSURE: 116 MMHG | HEIGHT: 65 IN | WEIGHT: 151.4 LBS | RESPIRATION RATE: 16 BRPM | DIASTOLIC BLOOD PRESSURE: 74 MMHG

## 2019-02-21 DIAGNOSIS — E78.2 MIXED DYSLIPIDEMIA: ICD-10-CM

## 2019-02-21 DIAGNOSIS — E55.9 VITAMIN D DEFICIENCY: ICD-10-CM

## 2019-02-21 DIAGNOSIS — E89.0 POSTOPERATIVE HYPOTHYROIDISM: Primary | ICD-10-CM

## 2019-02-21 DIAGNOSIS — N95.1 MENOPAUSAL SYMPTOM: ICD-10-CM

## 2019-02-21 PROCEDURE — 99214 OFFICE O/P EST MOD 30 MIN: CPT | Performed by: INTERNAL MEDICINE

## 2019-03-18 RX ORDER — LEVOTHYROXINE SODIUM 150 UG/1
150 TABLET ORAL DAILY
Qty: 90 TABLET | Refills: 1 | Status: SHIPPED | OUTPATIENT
Start: 2019-03-18 | End: 2019-06-10 | Stop reason: SDUPTHER

## 2019-06-10 ENCOUNTER — OFFICE VISIT (OUTPATIENT)
Dept: ENDOCRINOLOGY | Age: 54
End: 2019-06-10

## 2019-06-10 VITALS
HEIGHT: 65 IN | RESPIRATION RATE: 16 BRPM | DIASTOLIC BLOOD PRESSURE: 70 MMHG | BODY MASS INDEX: 23.99 KG/M2 | SYSTOLIC BLOOD PRESSURE: 110 MMHG | WEIGHT: 144 LBS

## 2019-06-10 DIAGNOSIS — E89.0 H/O THYROIDECTOMY: ICD-10-CM

## 2019-06-10 DIAGNOSIS — E78.2 MIXED DYSLIPIDEMIA: ICD-10-CM

## 2019-06-10 DIAGNOSIS — E03.9 ACQUIRED HYPOTHYROIDISM: ICD-10-CM

## 2019-06-10 DIAGNOSIS — E04.0 SIMPLE GOITER: ICD-10-CM

## 2019-06-10 DIAGNOSIS — E55.9 VITAMIN D DEFICIENCY: ICD-10-CM

## 2019-06-10 DIAGNOSIS — N95.1 MENOPAUSAL SYMPTOM: ICD-10-CM

## 2019-06-10 DIAGNOSIS — M81.0 OSTEOPOROSIS, UNSPECIFIED OSTEOPOROSIS TYPE, UNSPECIFIED PATHOLOGICAL FRACTURE PRESENCE: ICD-10-CM

## 2019-06-10 DIAGNOSIS — E89.0 POSTOPERATIVE HYPOTHYROIDISM: Primary | ICD-10-CM

## 2019-06-10 PROCEDURE — 99214 OFFICE O/P EST MOD 30 MIN: CPT | Performed by: INTERNAL MEDICINE

## 2019-06-10 RX ORDER — ROSUVASTATIN CALCIUM 20 MG/1
20 TABLET, COATED ORAL NIGHTLY
Qty: 90 TABLET | Refills: 3 | Status: SHIPPED | OUTPATIENT
Start: 2019-06-10 | End: 2020-01-17 | Stop reason: SDUPTHER

## 2019-06-10 RX ORDER — ALENDRONATE SODIUM 70 MG/1
70 TABLET ORAL
Qty: 12 TABLET | Refills: 3 | Status: SHIPPED | OUTPATIENT
Start: 2019-06-10 | End: 2019-07-05 | Stop reason: SDUPTHER

## 2019-06-10 RX ORDER — LEVOTHYROXINE SODIUM 150 UG/1
150 TABLET ORAL DAILY
Qty: 90 TABLET | Refills: 1 | Status: SHIPPED | OUTPATIENT
Start: 2019-06-10 | End: 2019-10-12 | Stop reason: SDUPTHER

## 2019-06-10 RX ORDER — ERGOCALCIFEROL 1.25 MG/1
CAPSULE ORAL
Qty: 26 CAPSULE | Refills: 3 | Status: SHIPPED | OUTPATIENT
Start: 2019-06-10 | End: 2020-01-17 | Stop reason: SDUPTHER

## 2019-06-10 RX ORDER — QUETIAPINE FUMARATE 25 MG/1
25 TABLET, FILM COATED ORAL NIGHTLY
Qty: 30 TABLET | Refills: 11 | Status: SHIPPED | OUTPATIENT
Start: 2019-06-10 | End: 2019-06-17 | Stop reason: SDUPTHER

## 2019-06-10 NOTE — PROGRESS NOTES
"Subjective   Denisa Holt is a 53 y.o. female seen for follow up for hypothyroidism, vit d deficiency. No lab review. Patient denies any problems or concerns.   History of Present Illness this is a 53-year-old female known patient with history of thyroid nodule status post thyroidectomy and hypothyroidism as well as vitamin D deficiency and dyslipidemia with osteoporosis and postmenopausal symptoms.  Over the course of last 6 months she has had no significant health problems for which to go to the emergency room or hospital.    /70   Resp 16   Ht 165.1 cm (65\")   Wt 65.3 kg (144 lb)   LMP 05/04/2015   BMI 23.96 kg/m²      No Known Allergies    Current Outpatient Medications:   •  alendronate (FOSAMAX) 70 MG tablet, Take 1 tablet by mouth Every 7 (Seven) Days., Disp: 12 tablet, Rfl: 3  •  cetirizine (zyrTEC) 10 MG tablet, Take 1 tablet by mouth Daily., Disp: 90 tablet, Rfl: 3  •  estradiol-norethindrone (COMBIPATCH) 0.05-0.14 MG/DAY patch, Place 1 patch on the skin as directed by provider 2 (Two) Times a Week., Disp: 24 patch, Rfl: 3  •  LEVOXYL 150 MCG tablet, Take 1 tablet by mouth Daily., Disp: 90 tablet, Rfl: 1  •  rosuvastatin (CRESTOR) 20 MG tablet, Take 1 tablet by mouth Every Night., Disp: 90 tablet, Rfl: 3  •  vitamin D (ERGOCALCIFEROL) 00128 units capsule capsule, Take one capsule by mouth twice a week, Disp: 26 capsule, Rfl: 3      The following portions of the patient's history were reviewed and updated as appropriate: allergies, current medications, past family history, past medical history, past social history, past surgical history and problem list.    Review of Systems   Constitutional: Negative.    HENT: Negative.    Eyes: Negative.    Respiratory: Negative.    Cardiovascular: Negative.    Gastrointestinal: Negative.    Endocrine: Negative.    Genitourinary: Negative.    Musculoskeletal: Negative.    Skin: Negative.    Allergic/Immunologic: Negative.    Neurological: Negative.  "   Hematological: Negative.    Psychiatric/Behavioral: Negative.        Objective   Physical Exam   Constitutional: She is oriented to person, place, and time. She appears well-developed and well-nourished. No distress.   HENT:   Head: Normocephalic and atraumatic.   Right Ear: External ear normal.   Left Ear: External ear normal.   Nose: Nose normal.   Mouth/Throat: Oropharynx is clear and moist. No oropharyngeal exudate.   Eyes: Conjunctivae and EOM are normal. Pupils are equal, round, and reactive to light. Right eye exhibits no discharge. Left eye exhibits no discharge. No scleral icterus.   Neck: Normal range of motion. Neck supple. No JVD present. No tracheal deviation present. No thyromegaly present.   Healed the scar of thyroidectomy in lower neck.   Cardiovascular: Normal rate, regular rhythm, normal heart sounds and intact distal pulses. Exam reveals no gallop and no friction rub.   No murmur heard.  Pulmonary/Chest: Effort normal and breath sounds normal. No stridor. No respiratory distress. She has no wheezes. She has no rales. She exhibits no tenderness.   Abdominal: Soft. Bowel sounds are normal. She exhibits no distension and no mass. There is no tenderness. There is no rebound and no guarding. No hernia.   Musculoskeletal: Normal range of motion. She exhibits no edema, tenderness or deformity.   Lymphadenopathy:     She has no cervical adenopathy.   Neurological: She is alert and oriented to person, place, and time. She has normal reflexes. She displays normal reflexes. No cranial nerve deficit or sensory deficit. She exhibits normal muscle tone. Coordination normal.   Skin: Skin is warm and dry. No rash noted. She is not diaphoretic. No erythema. No pallor.   Psychiatric: She has a normal mood and affect. Her behavior is normal. Judgment and thought content normal.   Nursing note and vitals reviewed.        Assessment/Plan   Diagnoses and all orders for this visit:    Postoperative hypothyroidism  -      T3, Free  -     T4 & TSH ([facility])  -     T4, Free  -     Comprehensive Metabolic Panel  -     Comprehensive Thyroglobulin  -     Thyroglobulin With Anti-TG  -     Uric Acid  -     Vitamin D 25 Hydroxy  -     Lipid Panel  -     T4 & TSH ([facility]); Future  -     T3, Free; Future  -     T4, Free; Future  -     Uric Acid; Future  -     Vitamin D 25 Hydroxy; Future  -     Comprehensive Metabolic Panel; Future  -     Lipid Panel; Future  -     Comprehensive Thyroglobulin; Future    Simple goiter  -     T3, Free  -     T4 & TSH ([facility])  -     T4, Free  -     Comprehensive Metabolic Panel  -     Comprehensive Thyroglobulin  -     Thyroglobulin With Anti-TG  -     Uric Acid  -     Vitamin D 25 Hydroxy  -     Lipid Panel  -     T4 & TSH ([facility]); Future  -     T3, Free; Future  -     T4, Free; Future  -     Uric Acid; Future  -     Vitamin D 25 Hydroxy; Future  -     Comprehensive Metabolic Panel; Future  -     Lipid Panel; Future  -     Comprehensive Thyroglobulin; Future  -     vitamin D (ERGOCALCIFEROL) 70338 units capsule capsule; Take one capsule by mouth twice a week    Osteoporosis, unspecified osteoporosis type, unspecified pathological fracture presence  -     T3, Free  -     T4 & TSH ([facility])  -     T4, Free  -     Comprehensive Metabolic Panel  -     Comprehensive Thyroglobulin  -     Thyroglobulin With Anti-TG  -     Uric Acid  -     Vitamin D 25 Hydroxy  -     Lipid Panel  -     T4 & TSH ([facility]); Future  -     T3, Free; Future  -     T4, Free; Future  -     Uric Acid; Future  -     Vitamin D 25 Hydroxy; Future  -     Comprehensive Metabolic Panel; Future  -     Lipid Panel; Future  -     Comprehensive Thyroglobulin; Future    Menopausal symptom  -     T3, Free  -     T4 & TSH ([facility])  -     T4, Free  -     Comprehensive Metabolic Panel  -     Comprehensive Thyroglobulin  -     Thyroglobulin With Anti-TG  -     Uric Acid  -     Vitamin D 25 Hydroxy  -     Lipid Panel  -     T4 & TSH ([facility]);  Future  -     T3, Free; Future  -     T4, Free; Future  -     Uric Acid; Future  -     Vitamin D 25 Hydroxy; Future  -     Comprehensive Metabolic Panel; Future  -     Lipid Panel; Future  -     Comprehensive Thyroglobulin; Future    H/O thyroidectomy  -     T3, Free  -     T4 & TSH ([facility])  -     T4, Free  -     Comprehensive Metabolic Panel  -     Comprehensive Thyroglobulin  -     Thyroglobulin With Anti-TG  -     Uric Acid  -     Vitamin D 25 Hydroxy  -     Lipid Panel  -     T4 & TSH ([facility]); Future  -     T3, Free; Future  -     T4, Free; Future  -     Uric Acid; Future  -     Vitamin D 25 Hydroxy; Future  -     Comprehensive Metabolic Panel; Future  -     Lipid Panel; Future  -     Comprehensive Thyroglobulin; Future  -     vitamin D (ERGOCALCIFEROL) 35683 units capsule capsule; Take one capsule by mouth twice a week    Mixed dyslipidemia  -     T3, Free  -     T4 & TSH ([facility])  -     T4, Free  -     Comprehensive Metabolic Panel  -     Comprehensive Thyroglobulin  -     Thyroglobulin With Anti-TG  -     Uric Acid  -     Vitamin D 25 Hydroxy  -     Lipid Panel  -     T4 & TSH ([facility]); Future  -     T3, Free; Future  -     T4, Free; Future  -     Uric Acid; Future  -     Vitamin D 25 Hydroxy; Future  -     Comprehensive Metabolic Panel; Future  -     Lipid Panel; Future  -     Comprehensive Thyroglobulin; Future    Acquired hypothyroidism  -     vitamin D (ERGOCALCIFEROL) 04389 units capsule capsule; Take one capsule by mouth twice a week    Vitamin D deficiency  -     vitamin D (ERGOCALCIFEROL) 07931 units capsule capsule; Take one capsule by mouth twice a week    Other orders  -     rosuvastatin (CRESTOR) 20 MG tablet; Take 1 tablet by mouth Every Night.  -     LEVOXYL 150 MCG tablet; Take 1 tablet by mouth Daily.  -     alendronate (FOSAMAX) 70 MG tablet; Take 1 tablet by mouth Every 7 (Seven) Days.  -     estradiol-norethindrone (COMBIPATCH) 0.05-0.14 MG/DAY patch; Place 1 patch on the skin as  directed by provider 2 (Two) Times a Week.  -     QUEtiapine (SEROquel) 25 MG tablet; Take 1 tablet by mouth Every Night.        In summary I saw and examined this 53-year-old female for above-mentioned problems.  The reviewed her laboratory evaluation of November 5, 2018 and January 31, 2019 and at this point we will go ahead and order additional laboratory evaluation and once the results come back we will go ahead and call for any possible modification or new medications.  I will see her in 6 months or sooner if needed with laboratory evaluation prior to each office visit.

## 2019-06-13 LAB
25(OH)D3+25(OH)D2 SERPL-MCNC: 84.7 NG/ML (ref 30–100)
ALBUMIN SERPL-MCNC: 4.6 G/DL (ref 3.5–5.2)
ALBUMIN/GLOB SERPL: 1.6 G/DL
ALP SERPL-CCNC: 48 U/L (ref 39–117)
ALT SERPL-CCNC: 24 U/L (ref 1–33)
AST SERPL-CCNC: 25 U/L (ref 1–32)
BILIRUB SERPL-MCNC: 0.2 MG/DL (ref 0.2–1.2)
BUN SERPL-MCNC: 16 MG/DL (ref 6–20)
BUN/CREAT SERPL: 30.8 (ref 7–25)
CALCIUM SERPL-MCNC: 9.6 MG/DL (ref 8.6–10.5)
CHLORIDE SERPL-SCNC: 104 MMOL/L (ref 98–107)
CHOLEST SERPL-MCNC: 122 MG/DL (ref 0–200)
CO2 SERPL-SCNC: 25.2 MMOL/L (ref 22–29)
CREAT SERPL-MCNC: 0.52 MG/DL (ref 0.57–1)
GLOBULIN SER CALC-MCNC: 2.8 GM/DL
GLUCOSE SERPL-MCNC: 98 MG/DL (ref 65–99)
HDLC SERPL-MCNC: 51 MG/DL (ref 40–60)
INTERPRETATION: NORMAL
LDLC SERPL CALC-MCNC: 52 MG/DL (ref 0–100)
POTASSIUM SERPL-SCNC: 3.9 MMOL/L (ref 3.5–5.2)
PROT SERPL-MCNC: 7.4 G/DL (ref 6–8.5)
SODIUM SERPL-SCNC: 142 MMOL/L (ref 136–145)
T3FREE SERPL-MCNC: 2.6 PG/ML (ref 2–4.4)
T4 FREE SERPL-MCNC: 1.97 NG/DL (ref 0.93–1.7)
T4 SERPL-MCNC: 12.5 MCG/DL (ref 4.5–11.7)
THYROGLOB AB SERPL-ACNC: <1 IU/ML
THYROGLOB AB SERPL-ACNC: <1 IU/ML (ref 0–0.9)
THYROGLOB SERPL-MCNC: 0.6 NG/ML (ref 1.5–38.5)
THYROGLOB SERPL-MCNC: 0.7 NG/ML
THYROGLOB SERPL-MCNC: NORMAL NG/ML
TRIGL SERPL-MCNC: 94 MG/DL (ref 0–150)
TSH SERPL DL<=0.005 MIU/L-ACNC: 0.67 MIU/ML (ref 0.27–4.2)
URATE SERPL-MCNC: 2.8 MG/DL (ref 2.4–5.7)
VLDLC SERPL CALC-MCNC: 18.8 MG/DL

## 2019-06-17 RX ORDER — QUETIAPINE FUMARATE 25 MG/1
25 TABLET, FILM COATED ORAL NIGHTLY
Qty: 90 TABLET | Refills: 0 | Status: SHIPPED | OUTPATIENT
Start: 2019-06-17 | End: 2019-08-19

## 2019-07-05 RX ORDER — ALENDRONATE SODIUM 70 MG/1
70 TABLET ORAL
Qty: 12 TABLET | Refills: 3 | Status: SHIPPED | OUTPATIENT
Start: 2019-07-05 | End: 2020-01-17 | Stop reason: SDUPTHER

## 2019-08-07 ENCOUNTER — RESULTS ENCOUNTER (OUTPATIENT)
Dept: ENDOCRINOLOGY | Age: 54
End: 2019-08-07

## 2019-08-07 DIAGNOSIS — E55.9 VITAMIN D DEFICIENCY: ICD-10-CM

## 2019-08-07 DIAGNOSIS — E78.2 MIXED DYSLIPIDEMIA: ICD-10-CM

## 2019-08-07 DIAGNOSIS — N95.1 MENOPAUSAL SYMPTOM: ICD-10-CM

## 2019-08-07 DIAGNOSIS — E89.0 POSTOPERATIVE HYPOTHYROIDISM: ICD-10-CM

## 2019-08-19 ENCOUNTER — OFFICE VISIT (OUTPATIENT)
Dept: FAMILY MEDICINE CLINIC | Facility: CLINIC | Age: 54
End: 2019-08-19

## 2019-08-19 VITALS
OXYGEN SATURATION: 99 % | RESPIRATION RATE: 16 BRPM | HEART RATE: 70 BPM | HEIGHT: 65 IN | DIASTOLIC BLOOD PRESSURE: 60 MMHG | SYSTOLIC BLOOD PRESSURE: 94 MMHG | WEIGHT: 140 LBS | BODY MASS INDEX: 23.32 KG/M2

## 2019-08-19 DIAGNOSIS — Z00.00 ANNUAL PHYSICAL EXAM: Primary | ICD-10-CM

## 2019-08-19 DIAGNOSIS — J45.20 MILD INTERMITTENT REACTIVE AIRWAY DISEASE: ICD-10-CM

## 2019-08-19 DIAGNOSIS — Z23 NEED FOR VACCINATION: ICD-10-CM

## 2019-08-19 DIAGNOSIS — G47.00 INSOMNIA, UNSPECIFIED TYPE: ICD-10-CM

## 2019-08-19 LAB
CHOLEST SERPL-MCNC: 143 MG/DL (ref 0–200)
CHOLEST/HDLC SERPL: 2.31 {RATIO}
HDLC SERPL-MCNC: 62 MG/DL (ref 40–60)
LDLC SERPL CALC-MCNC: 66 MG/DL (ref 0–100)
TRIGL SERPL-MCNC: 73 MG/DL (ref 0–150)
TSH SERPL DL<=0.005 MIU/L-ACNC: 0.11 MIU/ML (ref 0.27–4.2)
VLDLC SERPL CALC-MCNC: 14.6 MG/DL

## 2019-08-19 PROCEDURE — 90715 TDAP VACCINE 7 YRS/> IM: CPT | Performed by: NURSE PRACTITIONER

## 2019-08-19 PROCEDURE — 99213 OFFICE O/P EST LOW 20 MIN: CPT | Performed by: NURSE PRACTITIONER

## 2019-08-19 PROCEDURE — 90472 IMMUNIZATION ADMIN EACH ADD: CPT | Performed by: NURSE PRACTITIONER

## 2019-08-19 PROCEDURE — 90471 IMMUNIZATION ADMIN: CPT | Performed by: NURSE PRACTITIONER

## 2019-08-19 PROCEDURE — 90632 HEPA VACCINE ADULT IM: CPT | Performed by: NURSE PRACTITIONER

## 2019-08-19 PROCEDURE — 99396 PREV VISIT EST AGE 40-64: CPT | Performed by: NURSE PRACTITIONER

## 2019-08-19 RX ORDER — TRAZODONE HYDROCHLORIDE 100 MG/1
100 TABLET ORAL NIGHTLY
Qty: 30 TABLET | Refills: 3 | Status: SHIPPED | OUTPATIENT
Start: 2019-08-19 | End: 2020-01-17 | Stop reason: ALTCHOICE

## 2019-08-19 RX ORDER — ALBUTEROL SULFATE 90 UG/1
2 AEROSOL, METERED RESPIRATORY (INHALATION) EVERY 4 HOURS PRN
Qty: 1 INHALER | Refills: 2 | Status: SHIPPED | OUTPATIENT
Start: 2019-08-19 | End: 2019-12-12 | Stop reason: SDUPTHER

## 2019-08-19 NOTE — PROGRESS NOTES
Preventive Exam    History of Present Illness: Denisa Holt is a 54 y.o. here for check up and review of routine health maintenance. She states she is doing well and has concerns regarding her sleep and sometimes being short of breath. She is a patient of Dr. Moy but is new to me.     Pt is being seen for c/o insomnia, which started several months ago after stopping her estrogen patch.  Pt reports that her endocrinologist prescribed her seroquel, but her niece, a psychiatrist, told her not to take it. She has tried no other over the counter medications.     Pt also is being reactive airway disease, mild, chronic. She reports that she noticed being short of breath while watching a scary movie. She states that she used to take Advair and albuterol, but has not taken these in a while.  She still is currently taking Zyrtec daily.     Past medical history, surgical history and family history have been reviewed.     REVIEW OF SYSTEMS  Constitutional: Negative.    HENT: Negative.    Eyes: Negative.  Wears glasses.  Respiratory: Shortness of air.    Cardiovascular: Negative.    Gastrointestinal: Negative.    Endocrine: Negative.  Heat intolerant due to menopause.   Genitourinary: Negative.    Musculoskeletal: Left wrist pain intermittently.   Skin: Negative.    Allergic/Immunologic: Negative.    Neurological: Negative.    Hematological: Bruises easily.   Psychiatric/Behavioral: Negative.       Review of Systems    PHYSICAL EXAM    Vitals:    08/19/19 1109   BP: 94/60   Pulse: 70   Resp: 16   SpO2: 99%       GENERAL: alert and oriented, afebrile and vital signs stable  HEENT: oral mucosa moist, PEERLA, EOM, conjunctiva normal  No cervical adenopathy  LUNGS: clear to ascultation bilaterally, no rales, no rhonchi. Positive for wheezing.   HEART: RRR S1 S2 without murmers, thrills, rubs or gallops  CHEST WALL: within normal limits, no tenderness  ABDOMEN: WNL. Normal BS.  EXTREMITIES: No clubbing, cyanosis or edema noted.  Normal Pulses.  SKIN: warm, dry, no rashes noted  NEURO: CN II- XII grossly intact    Physical Exam    Procedures    Denisa was seen today for annual exam.    Diagnoses and all orders for this visit:    Annual physical exam  -     Mammo Screening Bilateral With CAD; Future  -     CBC (No Diff)  -     Lipid Panel With / Chol / HDL Ratio  -     TSH  -     Hepatitis C Antibody    Need for vaccination  -     Tdap Vaccine Greater Than or Equal To 6yo IM  -     Hepatitis A Vaccine Adult IM    Insomnia, unspecified type  -     traZODone (DESYREL) 100 MG tablet; Take 1 tablet by mouth Every Night.    Mild intermittent reactive airway disease  -     albuterol sulfate  (90 Base) MCG/ACT inhaler; Inhale 2 puffs Every 4 (Four) Hours As Needed for Wheezing.        Problems Addressed this Visit        Other    Insomnia    Relevant Medications    traZODone (DESYREL) 100 MG tablet      Other Visit Diagnoses     Annual physical exam    -  Primary    Relevant Orders    Mammo Screening Bilateral With CAD    CBC (No Diff)    Lipid Panel With / Chol / HDL Ratio    TSH    Hepatitis C Antibody    Need for vaccination        Relevant Orders    Tdap Vaccine Greater Than or Equal To 6yo IM (Completed)    Hepatitis A Vaccine Adult IM (Completed)    Mild intermittent reactive airway disease        Relevant Medications    albuterol sulfate  (90 Base) MCG/ACT inhaler          Routine health maintenance reviewed and discussed with Denisa Holt.    Side affects of trazodone discussed with patient. Pt verbalized understanding.     15 extra minutes spent with patient addressing insomnia and reactive airway disease.     Follow-up in 1 year or as needed.

## 2019-08-19 NOTE — PATIENT INSTRUCTIONS
I will call you with your lab results.   Please call with any questions or concerns.     Annual Wellness Exam  Personal Prevention Plan of Service     Date of Office Visit:  2019  Encounter Provider:  LITZY Morris  Place of Service:  [facility]  Patient Name: Denisa Holt  :  1965    As part of the Annual Wellness portion of your visit today, we are providing you with this personalized preventive plan of services (PPPS). This plan is based upon recommendations of the [place] Preventive Services Task Force (USPSTF) and the Advisory Committee on Immunization Practices (ACIP).    This lists the preventive care services that should be considered, and provides dates of when you are due. Items listed as completed are up-to-date and do not require any further intervention.    Health Maintenance   Topic Date Due   • TDAP/TD VACCINES (1 - Tdap) 08/15/1982   • HEPATITIS C SCREENING  2016   • MAMMOGRAM  01/15/2019   • INFLUENZA VACCINE  2019   • ANNUAL PHYSICAL  2020   • DXA SCAN  10/29/2020   • PAP SMEAR  2022   • COLONOSCOPY  2023   • ZOSTER VACCINE  Completed       Orders Placed This Encounter   Procedures   • Mammo Screening Bilateral With CAD     Standing Status:   Future     Scheduling Instructions:      Pt prefers [facility] location.     Order Specific Question:   Reason for Exam:     Answer:   Breast Cancer Screening   • Tdap Vaccine Greater Than or Equal To 6yo IM   • Hepatitis A Vaccine Adult IM   • CBC (No Diff)   • Lipid Panel With / Chol / HDL Ratio   • TSH   • Hepatitis C Antibody       No Follow-up on file.

## 2019-08-20 ENCOUNTER — TELEPHONE (OUTPATIENT)
Dept: FAMILY MEDICINE CLINIC | Facility: CLINIC | Age: 54
End: 2019-08-20

## 2019-08-20 DIAGNOSIS — D64.9 ANEMIA, UNSPECIFIED TYPE: Primary | ICD-10-CM

## 2019-08-20 LAB
ERYTHROCYTE [DISTWIDTH] IN BLOOD BY AUTOMATED COUNT: 18.5 % (ref 12.3–15.4)
HCT VFR BLD AUTO: 32.5 % (ref 34–46.6)
HCV AB S/CO SERPL IA: <0.1 S/CO RATIO (ref 0–0.9)
HGB BLD-MCNC: 9.3 G/DL (ref 12–15.9)
MCH RBC QN AUTO: 23 PG (ref 26.6–33)
MCHC RBC AUTO-ENTMCNC: 28.6 G/DL (ref 31.5–35.7)
MCV RBC AUTO: 80.4 FL (ref 79–97)
PLATELET # BLD AUTO: 325 10*3/MM3 (ref 140–450)
RBC # BLD AUTO: 4.04 10*6/MM3 (ref 3.77–5.28)
WBC # BLD AUTO: 5.13 10*3/MM3 (ref 3.4–10.8)

## 2019-08-20 NOTE — TELEPHONE ENCOUNTER
Called and spoke with patient regarding her lab results. Advised her to stop by office to  [facility] to check for occult blood. I advised her to start iron supplement and that we will recheck her CBC in 1 month. Labs forwarded to Dr. Bowman, who is managing her TSH.

## 2019-08-21 ENCOUNTER — TRANSCRIBE ORDERS (OUTPATIENT)
Dept: ADMINISTRATIVE | Facility: HOSPITAL | Age: 54
End: 2019-08-21

## 2019-08-21 DIAGNOSIS — Z12.31 VISIT FOR SCREENING MAMMOGRAM: Primary | ICD-10-CM

## 2019-08-22 ENCOUNTER — LAB (OUTPATIENT)
Dept: FAMILY MEDICINE CLINIC | Facility: CLINIC | Age: 54
End: 2019-08-22

## 2019-08-22 DIAGNOSIS — D64.9 ANEMIA, UNSPECIFIED TYPE: ICD-10-CM

## 2019-08-23 DIAGNOSIS — D50.9 IRON DEFICIENCY ANEMIA, UNSPECIFIED IRON DEFICIENCY ANEMIA TYPE: Primary | ICD-10-CM

## 2019-08-23 LAB
HEMOCCULT STL QL IA: NEGATIVE
SPECIMEN STATUS: NORMAL

## 2019-09-06 ENCOUNTER — APPOINTMENT (OUTPATIENT)
Dept: MAMMOGRAPHY | Facility: HOSPITAL | Age: 54
End: 2019-09-06

## 2019-09-11 RX ORDER — CETIRIZINE HYDROCHLORIDE 10 MG/1
10 TABLET ORAL DAILY
Qty: 90 TABLET | Refills: 3 | Status: SHIPPED | OUTPATIENT
Start: 2019-09-11 | End: 2020-09-03

## 2019-09-13 ENCOUNTER — LAB (OUTPATIENT)
Dept: FAMILY MEDICINE CLINIC | Facility: CLINIC | Age: 54
End: 2019-09-13

## 2019-09-13 DIAGNOSIS — D64.9 ANEMIA, UNSPECIFIED TYPE: ICD-10-CM

## 2019-09-13 LAB
ERYTHROCYTE [DISTWIDTH] IN BLOOD BY AUTOMATED COUNT: 21.2 % (ref 12.3–15.4)
HCT VFR BLD AUTO: 32.4 % (ref 34–46.6)
HGB BLD-MCNC: 9.8 G/DL (ref 12–15.9)
MCH RBC QN AUTO: 25.2 PG (ref 26.6–33)
MCHC RBC AUTO-ENTMCNC: 30.2 G/DL (ref 31.5–35.7)
MCV RBC AUTO: 83.3 FL (ref 79–97)
PLATELET # BLD AUTO: 309 10*3/MM3 (ref 140–450)
RBC # BLD AUTO: 3.89 10*6/MM3 (ref 3.77–5.28)
WBC # BLD AUTO: 6.63 10*3/MM3 (ref 3.4–10.8)

## 2019-10-13 RX ORDER — LEVOTHYROXINE SODIUM 150 UG/1
TABLET ORAL
Qty: 90 TABLET | Refills: 4 | Status: SHIPPED | OUTPATIENT
Start: 2019-10-13 | End: 2020-01-17 | Stop reason: SDUPTHER

## 2019-10-25 ENCOUNTER — HOSPITAL ENCOUNTER (OUTPATIENT)
Dept: MAMMOGRAPHY | Facility: HOSPITAL | Age: 54
Discharge: HOME | End: 2019-10-25
Admitting: NURSE PRACTITIONER

## 2019-10-25 DIAGNOSIS — Z12.31 VISIT FOR SCREENING MAMMOGRAM: ICD-10-CM

## 2019-10-25 PROCEDURE — 77063 BREAST TOMOSYNTHESIS BI: CPT

## 2019-10-25 PROCEDURE — 77067 SCR MAMMO BI INCL CAD: CPT

## 2019-12-02 ENCOUNTER — RESULTS ENCOUNTER (OUTPATIENT)
Dept: ENDOCRINOLOGY | Age: 54
End: 2019-12-02

## 2019-12-02 DIAGNOSIS — M81.0 OSTEOPOROSIS, UNSPECIFIED OSTEOPOROSIS TYPE, UNSPECIFIED PATHOLOGICAL FRACTURE PRESENCE: ICD-10-CM

## 2019-12-02 DIAGNOSIS — E89.0 H/O THYROIDECTOMY: ICD-10-CM

## 2019-12-02 DIAGNOSIS — E89.0 POSTOPERATIVE HYPOTHYROIDISM: ICD-10-CM

## 2019-12-02 DIAGNOSIS — N95.1 MENOPAUSAL SYMPTOM: ICD-10-CM

## 2019-12-02 DIAGNOSIS — E78.2 MIXED DYSLIPIDEMIA: ICD-10-CM

## 2019-12-02 DIAGNOSIS — E04.0 SIMPLE GOITER: ICD-10-CM

## 2019-12-12 DIAGNOSIS — J45.20 MILD INTERMITTENT REACTIVE AIRWAY DISEASE: ICD-10-CM

## 2019-12-12 RX ORDER — ALBUTEROL SULFATE 90 UG/1
AEROSOL, METERED RESPIRATORY (INHALATION)
Qty: 8.5 INHALER | Refills: 2 | Status: SHIPPED | OUTPATIENT
Start: 2019-12-12 | End: 2020-10-28

## 2019-12-20 LAB
25(OH)D3+25(OH)D2 SERPL-MCNC: 78.3 NG/ML (ref 30–100)
ALBUMIN SERPL-MCNC: 4.4 G/DL (ref 3.5–5.2)
ALBUMIN/GLOB SERPL: 1.8 G/DL
ALP SERPL-CCNC: 49 U/L (ref 39–117)
ALT SERPL-CCNC: 24 U/L (ref 1–33)
AST SERPL-CCNC: 26 U/L (ref 1–32)
BILIRUB SERPL-MCNC: <0.2 MG/DL (ref 0.2–1.2)
BUN SERPL-MCNC: 16 MG/DL (ref 6–20)
BUN/CREAT SERPL: 21.3 (ref 7–25)
CALCIUM SERPL-MCNC: 8.8 MG/DL (ref 8.6–10.5)
CHLORIDE SERPL-SCNC: 103 MMOL/L (ref 98–107)
CHOLEST SERPL-MCNC: 131 MG/DL (ref 0–200)
CO2 SERPL-SCNC: 25.3 MMOL/L (ref 22–29)
CREAT SERPL-MCNC: 0.75 MG/DL (ref 0.57–1)
GLOBULIN SER CALC-MCNC: 2.4 GM/DL
GLUCOSE SERPL-MCNC: 100 MG/DL (ref 65–99)
HDLC SERPL-MCNC: 53 MG/DL (ref 40–60)
INTERPRETATION: NORMAL
LDLC SERPL CALC-MCNC: 55 MG/DL (ref 0–100)
POTASSIUM SERPL-SCNC: 4.3 MMOL/L (ref 3.5–5.2)
PROT SERPL-MCNC: 6.8 G/DL (ref 6–8.5)
SODIUM SERPL-SCNC: 141 MMOL/L (ref 136–145)
T3FREE SERPL-MCNC: 2.6 PG/ML (ref 2–4.4)
T4 FREE SERPL-MCNC: 1.65 NG/DL (ref 0.93–1.7)
T4 SERPL-MCNC: 9.64 MCG/DL (ref 4.5–11.7)
THYROGLOB AB SERPL-ACNC: <1 IU/ML
THYROGLOB SERPL-MCNC: 0.4 NG/ML
THYROGLOB SERPL-MCNC: NORMAL NG/ML
TRIGL SERPL-MCNC: 117 MG/DL (ref 0–150)
TSH SERPL DL<=0.005 MIU/L-ACNC: 0.68 UIU/ML (ref 0.27–4.2)
URATE SERPL-MCNC: 3.2 MG/DL (ref 2.4–5.7)
VLDLC SERPL CALC-MCNC: 23.4 MG/DL

## 2020-01-17 ENCOUNTER — OFFICE VISIT (OUTPATIENT)
Dept: ENDOCRINOLOGY | Age: 55
End: 2020-01-17

## 2020-01-17 VITALS
BODY MASS INDEX: 23.43 KG/M2 | SYSTOLIC BLOOD PRESSURE: 114 MMHG | HEIGHT: 65 IN | DIASTOLIC BLOOD PRESSURE: 68 MMHG | RESPIRATION RATE: 16 BRPM | WEIGHT: 140.6 LBS

## 2020-01-17 DIAGNOSIS — E78.2 MIXED DYSLIPIDEMIA: ICD-10-CM

## 2020-01-17 DIAGNOSIS — E04.0 SIMPLE GOITER: ICD-10-CM

## 2020-01-17 DIAGNOSIS — E03.9 ACQUIRED HYPOTHYROIDISM: ICD-10-CM

## 2020-01-17 DIAGNOSIS — E55.9 VITAMIN D DEFICIENCY: ICD-10-CM

## 2020-01-17 DIAGNOSIS — E89.0 POSTOPERATIVE HYPOTHYROIDISM: Primary | ICD-10-CM

## 2020-01-17 DIAGNOSIS — E89.0 POSTOPERATIVE HYPOTHYROIDISM: ICD-10-CM

## 2020-01-17 DIAGNOSIS — M81.0 OSTEOPOROSIS, UNSPECIFIED OSTEOPOROSIS TYPE, UNSPECIFIED PATHOLOGICAL FRACTURE PRESENCE: ICD-10-CM

## 2020-01-17 DIAGNOSIS — N95.1 MENOPAUSAL SYMPTOM: ICD-10-CM

## 2020-01-17 DIAGNOSIS — E89.0 H/O THYROIDECTOMY: ICD-10-CM

## 2020-01-17 PROCEDURE — 99214 OFFICE O/P EST MOD 30 MIN: CPT | Performed by: INTERNAL MEDICINE

## 2020-01-17 RX ORDER — ERGOCALCIFEROL 1.25 MG/1
CAPSULE ORAL
Qty: 26 CAPSULE | Refills: 3 | Status: SHIPPED | OUTPATIENT
Start: 2020-01-17 | End: 2020-12-28

## 2020-01-17 RX ORDER — CEFDINIR 300 MG/1
300 CAPSULE ORAL 2 TIMES DAILY
Qty: 20 CAPSULE | Refills: 3 | Status: SHIPPED | OUTPATIENT
Start: 2020-01-17 | End: 2020-09-03

## 2020-01-17 RX ORDER — LEVOTHYROXINE SODIUM 150 UG/1
150 TABLET ORAL DAILY
Qty: 90 TABLET | Refills: 3 | Status: SHIPPED | OUTPATIENT
Start: 2020-01-17 | End: 2020-09-04

## 2020-01-17 RX ORDER — MONTELUKAST SODIUM 10 MG/1
10 TABLET ORAL NIGHTLY
Qty: 30 TABLET | Refills: 11 | Status: SHIPPED | OUTPATIENT
Start: 2020-01-17 | End: 2020-10-05

## 2020-01-17 RX ORDER — ROSUVASTATIN CALCIUM 20 MG/1
20 TABLET, COATED ORAL NIGHTLY
Qty: 90 TABLET | Refills: 3 | Status: SHIPPED | OUTPATIENT
Start: 2020-01-17 | End: 2020-09-14 | Stop reason: ALTCHOICE

## 2020-01-17 RX ORDER — ALENDRONATE SODIUM 70 MG/1
70 TABLET ORAL
Qty: 12 TABLET | Refills: 3 | Status: SHIPPED | OUTPATIENT
Start: 2020-01-17 | End: 2020-12-18

## 2020-01-17 NOTE — PROGRESS NOTES
"Darleen Holt is a 54 y.o. female seen for follow up for hypothyroidism, vit d deficiency, lab review. patient denies any problems or concerns.    History of Present Illness this is a 54-year-old female known patient with history of thyroid nodule status post thyroidectomy and post operative hypothyroidism as well as dyslipidemia and vitamin D deficiency with osteoporosis.  Over the course of last 12 months she has had no significant health problem for which to go to the ER or hospital.  /68   Resp 16   Ht 165.1 cm (65\")   Wt 63.8 kg (140 lb 9.6 oz)   LMP 05/04/2015   BMI 23.40 kg/m²   No Known Allergies    Current Outpatient Medications:   •  ADVAIR DISKUS 500-50 MCG/DOSE DISKUS, INHALE 1 PUFF 2 (TWO) TIMES A DAY., Disp: 60 each, Rfl: 5  •  ALBUTEROL SULFATE  (90 Base) MCG/ACT inhaler, TAKE 2 PUFFS BY MOUTH EVERY 4 HOURS AS NEEDED FOR WHEEZE, Disp: 8.5 inhaler, Rfl: 2  •  alendronate (FOSAMAX) 70 MG tablet, Take 1 tablet by mouth Every 7 (Seven) Days., Disp: 12 tablet, Rfl: 3  •  cetirizine (zyrTEC) 10 MG tablet, Take 1 tablet by mouth Daily., Disp: 90 tablet, Rfl: 3  •  LEVOXYL 150 MCG tablet, TAKE 1 TABLET BY MOUTH EVERY DAY, Disp: 90 tablet, Rfl: 4  •  rosuvastatin (CRESTOR) 20 MG tablet, Take 1 tablet by mouth Every Night., Disp: 90 tablet, Rfl: 3  •  vitamin D (ERGOCALCIFEROL) 92575 units capsule capsule, Take one capsule by mouth twice a week, Disp: 26 capsule, Rfl: 3      The following portions of the patient's history were reviewed and updated as appropriate: allergies, current medications, past family history, past medical history, past social history, past surgical history and problem list.    Review of Systems   Constitutional: Negative.    HENT: Negative.    Eyes: Negative.    Respiratory: Negative.    Cardiovascular: Negative.    Gastrointestinal: Negative.    Endocrine: Negative.    Genitourinary: Negative.    Musculoskeletal: Negative.    Skin: Negative.  "   Allergic/Immunologic: Negative.    Neurological: Negative.    Hematological: Negative.    Psychiatric/Behavioral: Negative.    The above review of system was reviewed, corroborated and accepted.    Objective   Physical Exam   Constitutional: She is oriented to person, place, and time. She appears well-developed and well-nourished. No distress.   HENT:   Head: Normocephalic and atraumatic.   Right Ear: External ear normal.   Left Ear: External ear normal.   Nose: Nose normal.   Mouth/Throat: Oropharynx is clear and moist. No oropharyngeal exudate.   Eyes: Pupils are equal, round, and reactive to light. Conjunctivae and EOM are normal. Right eye exhibits no discharge. Left eye exhibits no discharge. No scleral icterus.   Neck: Normal range of motion. Neck supple. No JVD present. No tracheal deviation present. No thyromegaly present.   Healed the scar of thyroidectomy in lower neck.   Cardiovascular: Normal rate, regular rhythm, normal heart sounds and intact distal pulses. Exam reveals no gallop and no friction rub.   No murmur heard.  Pulmonary/Chest: Effort normal and breath sounds normal. No stridor. No respiratory distress. She has no wheezes. She has no rales. She exhibits no tenderness.   Abdominal: Soft. Bowel sounds are normal. She exhibits no distension and no mass. There is no tenderness. There is no rebound and no guarding. No hernia.   Musculoskeletal: Normal range of motion. She exhibits no edema, tenderness or deformity.   Lymphadenopathy:     She has no cervical adenopathy.   Neurological: She is alert and oriented to person, place, and time. She has normal reflexes. She displays normal reflexes. No cranial nerve deficit or sensory deficit. She exhibits normal muscle tone. Coordination normal.   Skin: Skin is warm and dry. No rash noted. She is not diaphoretic. No erythema. No pallor.   Psychiatric: She has a normal mood and affect. Her behavior is normal. Judgment and thought content normal.    Nursing note and vitals reviewed.  Medication change since 6/10/2019 office visit.  Results for orders placed or performed in visit on 12/02/19   T4 & TSH ([facility])   Result Value Ref Range    TSH 0.677 0.270 - 4.200 uIU/mL    T4, Total 9.64 4.50 - 11.70 mcg/dL   T3, Free   Result Value Ref Range    T3, Free 2.6 2.0 - 4.4 pg/mL   T4, Free   Result Value Ref Range    Free T4 1.65 0.93 - 1.70 ng/dL   Uric Acid   Result Value Ref Range    Uric Acid 3.2 2.4 - 5.7 mg/dL   Vitamin D 25 Hydroxy   Result Value Ref Range    25 Hydroxy, Vitamin D 78.3 30.0 - 100.0 ng/ml   Comprehensive Metabolic Panel   Result Value Ref Range    Glucose 100 (H) 65 - 99 mg/dL    BUN 16 6 - 20 mg/dL    Creatinine 0.75 0.57 - 1.00 mg/dL    eGFR Non African Am 80 >60 mL/min/1.73    eGFR African Am 97 >60 mL/min/1.73    BUN/Creatinine Ratio 21.3 7.0 - 25.0    Sodium 141 136 - 145 mmol/L    Potassium 4.3 3.5 - 5.2 mmol/L    Chloride 103 98 - 107 mmol/L    Total CO2 25.3 22.0 - 29.0 mmol/L    Calcium 8.8 8.6 - 10.5 mg/dL    Total Protein 6.8 6.0 - 8.5 g/dL    Albumin 4.40 3.50 - 5.20 g/dL    Globulin 2.4 gm/dL    A/G Ratio 1.8 g/dL    Total Bilirubin <0.2 (L) 0.2 - 1.2 mg/dL    Alkaline Phosphatase 49 39 - 117 U/L    AST (SGOT) 26 1 - 32 U/L    ALT (SGPT) 24 1 - 33 U/L   Lipid Panel   Result Value Ref Range    Total Cholesterol 131 0 - 200 mg/dL    Triglycerides 117 0 - 150 mg/dL    HDL Cholesterol 53 40 - 60 mg/dL    VLDL Cholesterol 23.4 mg/dL    LDL Cholesterol  55 0 - 100 mg/dL   Comprehensive Thyroglobulin   Result Value Ref Range    Thyroglobulin Ab <1.0 IU/mL    Thyroglobulin 0.4 ng/mL    Thyroglobulin (TG-INDER) Comment ng/mL   Cardiovascular Risk Assessment   Result Value Ref Range    Interpretation Note          Assessment/Plan   Denisa was seen today for hypothyroidism.    Diagnoses and all orders for this visit:    Postoperative hypothyroidism  -     T4 & TSH ([facility]); Future  -     T3, Free; Future  -     T4, Free; Future  -     Uric  Acid; Future  -     Comprehensive Metabolic Panel; Future  -     NMR LipoProfile; Future    Vitamin D deficiency  -     T4 & TSH ([facility]); Future  -     T3, Free; Future  -     T4, Free; Future  -     Uric Acid; Future  -     Comprehensive Metabolic Panel; Future  -     NMR LipoProfile; Future  -     vitamin D (ERGOCALCIFEROL) 1.25 MG (86559 UT) capsule capsule; Take one capsule by mouth twice a week    Menopausal symptom  -     DEXA Bone Density Axial; Future  -     T4 & TSH ([facility]); Future  -     T3, Free; Future  -     T4, Free; Future  -     Uric Acid; Future  -     Comprehensive Metabolic Panel; Future  -     NMR LipoProfile; Future    Osteoporosis, unspecified osteoporosis type, unspecified pathological fracture presence  -     T4 & TSH ([facility]); Future  -     T3, Free; Future  -     T4, Free; Future  -     Uric Acid; Future  -     Comprehensive Metabolic Panel; Future  -     NMR LipoProfile; Future    Mixed dyslipidemia  -     T4 & TSH ([facility]); Future  -     T3, Free; Future  -     T4, Free; Future  -     Uric Acid; Future  -     Comprehensive Metabolic Panel; Future  -     NMR LipoProfile; Future    Acquired hypothyroidism  -     vitamin D (ERGOCALCIFEROL) 1.25 MG (16997 UT) capsule capsule; Take one capsule by mouth twice a week    Simple goiter  -     vitamin D (ERGOCALCIFEROL) 1.25 MG (22948 UT) capsule capsule; Take one capsule by mouth twice a week    H/O thyroidectomy  -     vitamin D (ERGOCALCIFEROL) 1.25 MG (56840 UT) capsule capsule; Take one capsule by mouth twice a week    Other orders  -     rosuvastatin (CRESTOR) 20 MG tablet; Take 1 tablet by mouth Every Night.  -     LEVOXYL 150 MCG tablet; Take 1 tablet by mouth Daily.  -     alendronate (FOSAMAX) 70 MG tablet; Take 1 tablet by mouth Every 7 (Seven) Days.  -     estradiol-norethindrone (COMBIPATCH) 0.05-0.14 MG/DAY patch; Place 1 patch on the skin as directed by provider 2 (Two) Times a Week.  -     cefdinir (OMNICEF) 300 MG capsule;  Take 1 capsule by mouth 2 (Two) Times a Day.  -     montelukast (SINGULAIR) 10 MG tablet; Take 1 tablet by mouth Every Night.  -     fluticasone (FLONASE SENSIMIST) 27.5 MCG/SPRAY nasal spray; 1 squirt in each nostril twice daily      In summary I saw and examined this 54-year-old female for above-mentioned problems.  I reviewed her laboratory evaluation of 12/16/2019 and provided her with her hard copy of it.  Overall she is clinically and metabolically stable and therefore we will go ahead and continue all her current prescriptions.  We will also order a new DEXA scan.  Her in 6 months or sooner if needed with laboratory evaluation prior to each office visit.

## 2020-08-14 ENCOUNTER — TELEPHONE (OUTPATIENT)
Dept: FAMILY MEDICINE CLINIC | Facility: CLINIC | Age: 55
End: 2020-08-14

## 2020-08-14 NOTE — TELEPHONE ENCOUNTER
PT IS CALLING IN BECAUSE SHE WANTS TO KNOW WHAT HER BLOOD TYPE IS.      PT CALL BACK  [phone]

## 2020-09-01 ENCOUNTER — TRANSCRIBE ORDERS (OUTPATIENT)
Dept: ADMINISTRATIVE | Facility: HOSPITAL | Age: 55
End: 2020-09-01

## 2020-09-01 DIAGNOSIS — Z12.31 ENCOUNTER FOR SCREENING MAMMOGRAM FOR BREAST CANCER: Primary | ICD-10-CM

## 2020-09-03 ENCOUNTER — OFFICE VISIT (OUTPATIENT)
Dept: ENDOCRINOLOGY | Age: 55
End: 2020-09-03

## 2020-09-03 ENCOUNTER — TELEPHONE (OUTPATIENT)
Dept: FAMILY MEDICINE CLINIC | Facility: CLINIC | Age: 55
End: 2020-09-03

## 2020-09-03 VITALS
BODY MASS INDEX: 25.86 KG/M2 | DIASTOLIC BLOOD PRESSURE: 64 MMHG | HEIGHT: 65 IN | SYSTOLIC BLOOD PRESSURE: 110 MMHG | WEIGHT: 155.2 LBS

## 2020-09-03 DIAGNOSIS — E89.0 POSTOPERATIVE HYPOTHYROIDISM: Primary | ICD-10-CM

## 2020-09-03 DIAGNOSIS — E55.9 VITAMIN D DEFICIENCY: ICD-10-CM

## 2020-09-03 DIAGNOSIS — N95.1 MENOPAUSAL SYMPTOM: ICD-10-CM

## 2020-09-03 DIAGNOSIS — E89.0 H/O THYROIDECTOMY: ICD-10-CM

## 2020-09-03 PROCEDURE — 99214 OFFICE O/P EST MOD 30 MIN: CPT | Performed by: NURSE PRACTITIONER

## 2020-09-03 RX ORDER — CETIRIZINE HYDROCHLORIDE 10 MG/1
TABLET ORAL
Qty: 90 TABLET | Refills: 3 | Status: SHIPPED | OUTPATIENT
Start: 2020-09-03

## 2020-09-03 NOTE — TELEPHONE ENCOUNTER
PT IS CALLING IN TO SEE IF SHE HAS HAD HER TWO PNEUMONIA SHOTS.  SHE WANTS TO GET THEM IF SHE HAS NOT.        PT CALL BACK  [phone]

## 2020-09-03 NOTE — PROGRESS NOTES
Subjective   Denisa Holt is a 55 y.o. female is here today for follow-up.  Chief Complaint   Patient presents with   • Hypothyroidism     FUP, hypothyroid, no recent labs, last eye exam in nov 2019 dr caldera, no problems or concerns         History of Present Illness   Encounter Diagnoses   Name Primary?   • H/O thyroidectomy    • Postoperative hypothyroidism Yes   • Menopausal symptom    • Vitamin D deficiency      55-year-old female patient here today for follow-up visit.  She has been seen for the above diagnoses.  She has a history of hypothyroid ectomy.  She is currently on Levoxyl 150 mcg daily.  She denies any symptoms associated with hyper or hypothyroidism.  She has not had recent labs and will have labs following today's visit.  She is currently taking a vitamin D capsule twice weekly.  Her previous labs were reviewed and were in satisfactory range.  No changes to her medications have been made today pending her lab results.    The following portions of the patient's history were reviewed and updated as appropriate: allergies, current medications, past family history, past medical history, past social history, past surgical history and problem list.    Review of Systems   Constitutional: Negative for appetite change, fatigue and unexpected weight change.   Cardiovascular: Negative for chest pain, palpitations and leg swelling.   Gastrointestinal: Negative for abdominal pain, constipation and diarrhea.   Endocrine: Negative for cold intolerance, heat intolerance, polydipsia, polyphagia and polyuria.   Neurological: Negative for dizziness, light-headedness, numbness and headaches.   Psychiatric/Behavioral: Negative for sleep disturbance.       Objective   Physical Exam   Constitutional: She is oriented to person, place, and time. She appears well-developed and well-nourished. No distress.   HENT:   Head: Normocephalic and atraumatic.   Right Ear: External ear normal.   Left Ear: External ear normal.   Nose:  Nose normal.   Mouth/Throat: Oropharynx is clear and moist. No oropharyngeal exudate.   Eyes: Pupils are equal, round, and reactive to light. Conjunctivae and EOM are normal. Right eye exhibits no discharge. Left eye exhibits no discharge. No scleral icterus.   Neck: Normal range of motion. Neck supple. No JVD present. No tracheal deviation present. No thyromegaly present.   Healed the scar of thyroidectomy in lower neck.   Cardiovascular: Normal rate, regular rhythm, normal heart sounds and intact distal pulses. Exam reveals no gallop and no friction rub.   No murmur heard.  Pulmonary/Chest: Effort normal and breath sounds normal. No stridor. No respiratory distress. She has no wheezes. She has no rales. She exhibits no tenderness.   Abdominal: Soft. Bowel sounds are normal. She exhibits no distension and no mass. There is no tenderness. There is no rebound and no guarding. No hernia.   Musculoskeletal: Normal range of motion. She exhibits no edema, tenderness or deformity.   Lymphadenopathy:     She has no cervical adenopathy.   Neurological: She is alert and oriented to person, place, and time. She has normal reflexes. She displays normal reflexes. No cranial nerve deficit. She exhibits normal muscle tone. Coordination normal.   Skin: Skin is warm and dry. No rash noted. She is not diaphoretic. No erythema. No pallor.   Psychiatric: She has a normal mood and affect. Her behavior is normal. Judgment and thought content normal.   Nursing note and vitals reviewed.    Results for orders placed or performed in visit on 12/02/19   T4 & TSH ([facility])   Result Value Ref Range    TSH 0.677 0.270 - 4.200 uIU/mL    T4, Total 9.64 4.50 - 11.70 mcg/dL   T3, Free   Result Value Ref Range    T3, Free 2.6 2.0 - 4.4 pg/mL   T4, Free   Result Value Ref Range    Free T4 1.65 0.93 - 1.70 ng/dL   Uric Acid   Result Value Ref Range    Uric Acid 3.2 2.4 - 5.7 mg/dL   Vitamin D 25 Hydroxy   Result Value Ref Range    25 Hydroxy, Vitamin  D 78.3 30.0 - 100.0 ng/ml   Comprehensive Metabolic Panel   Result Value Ref Range    Glucose 100 (H) 65 - 99 mg/dL    BUN 16 6 - 20 mg/dL    Creatinine 0.75 0.57 - 1.00 mg/dL    eGFR Non African Am 80 >60 mL/min/1.73    eGFR African Am 97 >60 mL/min/1.73    BUN/Creatinine Ratio 21.3 7.0 - 25.0    Sodium 141 136 - 145 mmol/L    Potassium 4.3 3.5 - 5.2 mmol/L    Chloride 103 98 - 107 mmol/L    Total CO2 25.3 22.0 - 29.0 mmol/L    Calcium 8.8 8.6 - 10.5 mg/dL    Total Protein 6.8 6.0 - 8.5 g/dL    Albumin 4.40 3.50 - 5.20 g/dL    Globulin 2.4 gm/dL    A/G Ratio 1.8 g/dL    Total Bilirubin <0.2 (L) 0.2 - 1.2 mg/dL    Alkaline Phosphatase 49 39 - 117 U/L    AST (SGOT) 26 1 - 32 U/L    ALT (SGPT) 24 1 - 33 U/L   Lipid Panel   Result Value Ref Range    Total Cholesterol 131 0 - 200 mg/dL    Triglycerides 117 0 - 150 mg/dL    HDL Cholesterol 53 40 - 60 mg/dL    VLDL Cholesterol 23.4 mg/dL    LDL Cholesterol  55 0 - 100 mg/dL   Comprehensive Thyroglobulin   Result Value Ref Range    Thyroglobulin Ab <1.0 IU/mL    Thyroglobulin 0.4 ng/mL    Thyroglobulin (TG-INDER) Comment ng/mL   Cardiovascular Risk Assessment   Result Value Ref Range    Interpretation Note          Assessment/Plan   Problems Addressed this Visit        Digestive    Vitamin D deficiency       Endocrine    Hypothyroidism - Primary       Genitourinary    Menopausal symptom       Other    H/O thyroidectomy          In summary patient was seen and examined.  She is no longer on hormone therapy for menopausal symptoms.  Her thyroid incision is healed.  She is taking Levoxyl 150 mcg.  She is asymptomatic with hyperthyroidism or hypothyroidism.  She will have extensive labs will be notified of the results along with any further recommendations.  Previous labs were stable.  She has no complaints at today's visit.  She will follow-up in our office as needed.

## 2020-09-04 LAB
25(OH)D3+25(OH)D2 SERPL-MCNC: 77.8 NG/ML (ref 30–100)
ABO GROUP BLD: NORMAL
BASOPHILS # BLD AUTO: 0.06 10*3/MM3 (ref 0–0.2)
BASOPHILS NFR BLD AUTO: 1.3 % (ref 0–1.5)
C PEPTIDE SERPL-MCNC: 2 NG/ML (ref 1.1–4.4)
CHOLEST SERPL-MCNC: 175 MG/DL (ref 0–200)
EOSINOPHIL # BLD AUTO: 0.1 10*3/MM3 (ref 0–0.4)
EOSINOPHIL NFR BLD AUTO: 2.1 % (ref 0.3–6.2)
ERYTHROCYTE [DISTWIDTH] IN BLOOD BY AUTOMATED COUNT: 14.6 % (ref 12.3–15.4)
FT4I SERPL CALC-MCNC: 3 (ref 1.2–4.9)
HBA1C MFR BLD: 4.9 % (ref 4.8–5.6)
HCT VFR BLD AUTO: 39.3 % (ref 34–46.6)
HDLC SERPL-MCNC: 71 MG/DL (ref 40–60)
HGB BLD-MCNC: 12.4 G/DL (ref 12–15.9)
IMM GRANULOCYTES # BLD AUTO: 0.01 10*3/MM3 (ref 0–0.05)
IMM GRANULOCYTES NFR BLD AUTO: 0.2 % (ref 0–0.5)
INTERPRETATION: NORMAL
LDLC SERPL CALC-MCNC: 85 MG/DL (ref 0–100)
LYMPHOCYTES # BLD AUTO: 1.93 10*3/MM3 (ref 0.7–3.1)
LYMPHOCYTES NFR BLD AUTO: 41 % (ref 19.6–45.3)
Lab: NORMAL
MCH RBC QN AUTO: 28.6 PG (ref 26.6–33)
MCHC RBC AUTO-ENTMCNC: 31.6 G/DL (ref 31.5–35.7)
MCV RBC AUTO: 90.6 FL (ref 79–97)
MONOCYTES # BLD AUTO: 0.51 10*3/MM3 (ref 0.1–0.9)
MONOCYTES NFR BLD AUTO: 10.8 % (ref 5–12)
NEUTROPHILS # BLD AUTO: 2.1 10*3/MM3 (ref 1.7–7)
NEUTROPHILS NFR BLD AUTO: 44.6 % (ref 42.7–76)
NRBC BLD AUTO-RTO: 0 /100 WBC (ref 0–0.2)
PLATELET # BLD AUTO: 269 10*3/MM3 (ref 140–450)
RBC # BLD AUTO: 4.34 10*6/MM3 (ref 3.77–5.28)
RH BLD: NEGATIVE
T3FREE SERPL-MCNC: 2.4 PG/ML (ref 2–4.4)
T3RU NFR SERPL: 25 % (ref 24–39)
T4 FREE SERPL-MCNC: 2.08 NG/DL (ref 0.93–1.7)
T4 SERPL-MCNC: 12.1 UG/DL (ref 4.5–12)
TRIGL SERPL-MCNC: 94 MG/DL (ref 0–150)
TSH SERPL DL<=0.005 MIU/L-ACNC: 6.06 UIU/ML (ref 0.45–4.5)
VLDLC SERPL CALC-MCNC: 18.8 MG/DL
WBC # BLD AUTO: 4.71 10*3/MM3 (ref 3.4–10.8)

## 2020-09-04 RX ORDER — LEVOTHYROXINE SODIUM 175 UG/1
175 TABLET ORAL DAILY
Qty: 90 TABLET | Refills: 3 | Status: SHIPPED | OUTPATIENT
Start: 2020-09-04 | End: 2021-04-16

## 2020-09-14 ENCOUNTER — TELEMEDICINE (OUTPATIENT)
Dept: FAMILY MEDICINE CLINIC | Facility: CLINIC | Age: 55
End: 2020-09-14

## 2020-09-14 VITALS — WEIGHT: 145 LBS | BODY MASS INDEX: 24.16 KG/M2 | HEIGHT: 65 IN

## 2020-09-14 DIAGNOSIS — K62.5 BRBPR (BRIGHT RED BLOOD PER RECTUM): ICD-10-CM

## 2020-09-14 DIAGNOSIS — E78.2 MIXED DYSLIPIDEMIA: Primary | ICD-10-CM

## 2020-09-14 PROBLEM — Z12.11 ENCOUNTER FOR SCREENING FOR MALIGNANT NEOPLASM OF COLON: Status: RESOLVED | Noted: 2018-11-13 | Resolved: 2020-09-14

## 2020-09-14 PROCEDURE — 99214 OFFICE O/P EST MOD 30 MIN: CPT | Performed by: FAMILY MEDICINE

## 2020-09-14 RX ORDER — ROSUVASTATIN CALCIUM 10 MG/1
10 TABLET, COATED ORAL DAILY
Qty: 90 TABLET | Refills: 3 | Status: SHIPPED | OUTPATIENT
Start: 2020-09-14 | End: 2021-02-01 | Stop reason: SDUPTHER

## 2020-09-14 NOTE — PROGRESS NOTES
Assessment and Plan:     Problem List Items Addressed This Visit        Other    Mixed dyslipidemia - Primary    Relevant Medications    rosuvastatin (Crestor) 10 MG tablet      Other Visit Diagnoses     BRBPR (bright red blood per rectum)        Relevant Orders    Ambulatory Referral to Gastroenterology        Patient was given instructions and counseling regarding her condition or for health maintenance advice. Please see specific information pulled into the AVS if appropriate.        SUBJECTIVE   Denisa Holt is a 55 y.o. female being seen today for Hyperlipidemia, Rectal Bleeding, and Hypothyroidism               Social History  She  reports that she has quit smoking. She started smoking about 3 years ago. She has never used smokeless tobacco. She reports current alcohol use. She reports that she does not use drugs.    History of the Present Illness   HPI Can reduce Vit D to once weekly though she may wait until the COVID pandemic is over, since there is some weak evidence that vitamin D may help with COVID.  She is getting some leg cramps and joint pain and would like to reduce her Crestor to 10 mg.  She prefers not to stop it.  She has excellent lipid results and I am okay with that.  She is also uncomfortable increasing her thyroid medication and would like to stay at the dose she is at.  Since her actual levels were normal even though her TSH was elevated I am okay with that.  She is having some bright red blood per rectum with mild pain.  She would like to go back and see the GI doctor about that.  I offered her an appointment to see me but given COVID she would prefer to go straight to the GI doctor.  Significant Past History  The following portions of the patient's history were reviewed and updated as appropriate:PMHroutine: Social history , Allergies, Current Medications, Active Problem List and Health Maintenance    Review of Systems   Constitutional: Negative for fatigue, fever and unexpected weight  change.   Respiratory: Negative for cough and shortness of breath.    Cardiovascular: Negative for chest pain.   Psychiatric/Behavioral: Negative for dysphoric mood. The patient is not nervous/anxious.    I have reviewed the ROS as documented by the MA. Aleja Robert MD      OBJECTIVE  Vital Signs          BP Readings from Last 1 Encounters:   09/03/20 110/64     Wt Readings from Last 3 Encounters:   09/14/20 65.8 kg (145 lb)   09/03/20 70.4 kg (155 lb 3.2 oz)   01/17/20 63.8 kg (140 lb 9.6 oz)   Body mass index is 24.13 kg/m².     Physical Exam  Constitutional:       Appearance: Normal appearance. She is well-developed.   Neurological:      Mental Status: She is alert.   Psychiatric:         Behavior: Behavior normal.         Thought Content: Thought content normal.         Judgment: Judgment normal.             T3, Free    Collection Time: 09/03/20 10:55 AM    Specimen: Blood   Result Value Ref Range    T3, Free 2.4 2.0 - 4.4 pg/mL   T4, Free    Collection Time: 09/03/20 10:55 AM    Specimen: Blood   Result Value Ref Range    Free T4 2.08 (H) 0.93 - 1.70 ng/dL   Thyroid Panel With TSH   Result Value Ref Range    TSH 6.060 (H) 0.450 - 4.500 uIU/mL    T4, Total 12.1 (H) 4.5 - 12.0 ug/dL    T3 Uptake 25 24 - 39 %    Free Thyroxine Index 3.0 1.2 - 4.9   Vitamin D 25 Hydroxy    25 Hydroxy, Vitamin D 77.8 30.0 - 100.0 ng/mL   Hemoglobin A1c    Hemoglobin A1C 4.90 4.80 - 5.60 %   Lipid Panel   Result Value Ref Range    Total Cholesterol 175 0 - 200 mg/dL    Triglycerides 94 0 - 150 mg/dL    HDL Cholesterol 71 (H) 40 - 60 mg/dL    VLDL Cholesterol Vasquez 18.8 mg/dL    LDL Chol Calc (NIH) 85 0 - 100 mg/dL     Patient chose to receive care through a telehealth visit.  She consents to use a video/audio connection for her medical care today.

## 2020-09-21 ENCOUNTER — TELEPHONE (OUTPATIENT)
Dept: FAMILY MEDICINE CLINIC | Facility: CLINIC | Age: 55
End: 2020-09-21

## 2020-09-21 NOTE — TELEPHONE ENCOUNTER
PATIENT STATES THAT HER CHOLESTEROL IS HIGH AND THE MEDICATION rosuvastatin (Crestor) 10 MG tablet IS NOT HELPING.   SHE IS CONCERNED WITH THE HIGH CHOLESTEROL AND WANTS TO SEE IF SHE IS HAVING ANY HARDENING OF THE ARTERIES.  SHE IS REQUESTING AN EKG. SHE WANTS TO MANAGER HER HEART CONDITIONS.    PLEASE ADVISE    CALL BACK #: [phone]

## 2020-09-21 NOTE — TELEPHONE ENCOUNTER
Left  for a rtn call.    Pt just had a video visit with dr. zarate and her cholesterol is fine.  Not sure what message is about.

## 2020-10-02 NOTE — PROGRESS NOTES
Assessment and Plan:     Problem List Items Addressed This Visit     None      Visit Diagnoses     IGLESIAS (dyspnea on exertion)    -  Primary    Relevant Orders    Stress test    ECG 12 Lead        Return if symptoms worsen or fail to improve.  Patient was given instructions and counseling regarding her condition or for health maintenance advice. Please see specific information pulled into the AVS if appropriate.        SONIA Holt is a 55 y.o. female being seen today for cardiac concerns (wants stress test )               Social History  She  reports that she has quit smoking. She started smoking about 3 years ago. She has never used smokeless tobacco. She reports current alcohol use. She reports that she does not use drugs.    History of the Present Illness   HPI patient is having more shortness of breath with exercise.  Her physician daughter and nurse daughter are both concerned about it.  She denies chest pain or other symptoms.  She has excellent cholesterol.  Her father had his first heart attack in his late 50s.  She did quit smoking.  She would like a stress test.  We did discuss the fact that stress test will not show plaque.  She may want to consider a cardiac calcium scoring test.  Her risks are really quite low.  She has excellent cholesterol, a normal EKG other than some bradycardia, she exercises routinely and is eating a good diet.  Significant Past History  The following portions of the patient's history were reviewed and updated as appropriate:PMHroutine: Social history , Allergies, Current Medications, Active Problem List, Family History and Health Maintenance    Review of Systems   Constitutional: Negative for activity change, appetite change, chills, fatigue, fever and unexpected weight change.   HENT: Negative for congestion, ear pain, hearing loss, nosebleeds, rhinorrhea and sore throat.    Eyes: Negative for pain, redness and visual disturbance.   Respiratory: Positive for  shortness of breath (when exercising only). Negative for cough and wheezing.    Cardiovascular: Negative for chest pain, palpitations and leg swelling.   Gastrointestinal: Negative for abdominal pain, blood in stool, constipation, diarrhea, nausea and vomiting.   Endocrine: Negative for cold intolerance and heat intolerance.   Genitourinary: Negative for difficulty urinating, dysuria, frequency, hematuria, pelvic pain, urgency and vaginal discharge.   Musculoskeletal: Negative for arthralgias, back pain and joint swelling.   Skin: Negative for rash and wound.   Neurological: Negative for dizziness, weakness, numbness and headaches.   Hematological: Does not bruise/bleed easily.   Psychiatric/Behavioral: Negative for dysphoric mood, sleep disturbance and suicidal ideas. The patient is not nervous/anxious.    I have reviewed the ROS as documented by the MA. Aleja Robert MD      OBJECTIVE  Vital Signs          BP Readings from Last 1 Encounters:   10/05/20 102/68     Wt Readings from Last 3 Encounters:   10/05/20 67.1 kg (148 lb)   09/14/20 65.8 kg (145 lb)   09/03/20 70.4 kg (155 lb 3.2 oz)   Body mass index is 24.63 kg/m².     Physical Exam  Vitals signs reviewed.   Constitutional:       Appearance: Normal appearance. She is well-developed and normal weight.   Cardiovascular:      Rate and Rhythm: Normal rate and regular rhythm.      Heart sounds: Normal heart sounds.   Pulmonary:      Effort: Pulmonary effort is normal.      Breath sounds: Normal breath sounds.   Neurological:      Mental Status: She is alert.   Psychiatric:         Behavior: Behavior normal.         Thought Content: Thought content normal.         Judgment: Judgment normal.               Procedures   EKG is normal other than some mild bradycardia.  It is unchanged from 1/3/2017.

## 2020-10-05 ENCOUNTER — OFFICE VISIT (OUTPATIENT)
Dept: FAMILY MEDICINE CLINIC | Facility: CLINIC | Age: 55
End: 2020-10-05

## 2020-10-05 VITALS
TEMPERATURE: 97.8 F | RESPIRATION RATE: 12 BRPM | HEIGHT: 65 IN | BODY MASS INDEX: 24.66 KG/M2 | OXYGEN SATURATION: 98 % | SYSTOLIC BLOOD PRESSURE: 102 MMHG | DIASTOLIC BLOOD PRESSURE: 68 MMHG | WEIGHT: 148 LBS | HEART RATE: 62 BPM

## 2020-10-05 DIAGNOSIS — R06.09 DOE (DYSPNEA ON EXERTION): Primary | ICD-10-CM

## 2020-10-05 PROCEDURE — 93000 ELECTROCARDIOGRAM COMPLETE: CPT | Performed by: FAMILY MEDICINE

## 2020-10-05 PROCEDURE — 99213 OFFICE O/P EST LOW 20 MIN: CPT | Performed by: FAMILY MEDICINE

## 2020-10-08 ENCOUNTER — OFFICE VISIT (OUTPATIENT)
Dept: GASTROENTEROLOGY | Facility: CLINIC | Age: 55
End: 2020-10-08

## 2020-10-08 VITALS — HEIGHT: 65 IN | BODY MASS INDEX: 24.89 KG/M2 | WEIGHT: 149.4 LBS

## 2020-10-08 DIAGNOSIS — K63.5 HYPERPLASTIC COLONIC POLYP, UNSPECIFIED PART OF COLON: ICD-10-CM

## 2020-10-08 DIAGNOSIS — K62.5 RECTAL BLEEDING: Primary | ICD-10-CM

## 2020-10-08 DIAGNOSIS — K64.8 OTHER HEMORRHOIDS: ICD-10-CM

## 2020-10-08 LAB
BASOPHILS # BLD AUTO: 0.02 10*3/MM3 (ref 0–0.2)
BASOPHILS NFR BLD AUTO: 0.5 % (ref 0–1.5)
EOSINOPHIL # BLD AUTO: 0.12 10*3/MM3 (ref 0–0.4)
EOSINOPHIL NFR BLD AUTO: 3 % (ref 0.3–6.2)
ERYTHROCYTE [DISTWIDTH] IN BLOOD BY AUTOMATED COUNT: 13.8 % (ref 12.3–15.4)
HCT VFR BLD AUTO: 34.6 % (ref 34–46.6)
HGB BLD-MCNC: 11.3 G/DL (ref 12–15.9)
IMM GRANULOCYTES # BLD AUTO: 0 10*3/MM3 (ref 0–0.05)
IMM GRANULOCYTES NFR BLD AUTO: 0 % (ref 0–0.5)
LYMPHOCYTES # BLD AUTO: 1.63 10*3/MM3 (ref 0.7–3.1)
LYMPHOCYTES NFR BLD AUTO: 40.5 % (ref 19.6–45.3)
MCH RBC QN AUTO: 29.2 PG (ref 26.6–33)
MCHC RBC AUTO-ENTMCNC: 32.7 G/DL (ref 31.5–35.7)
MCV RBC AUTO: 89.4 FL (ref 79–97)
MONOCYTES # BLD AUTO: 0.41 10*3/MM3 (ref 0.1–0.9)
MONOCYTES NFR BLD AUTO: 10.2 % (ref 5–12)
NEUTROPHILS # BLD AUTO: 1.84 10*3/MM3 (ref 1.7–7)
NEUTROPHILS NFR BLD AUTO: 45.8 % (ref 42.7–76)
NRBC BLD AUTO-RTO: 0 /100 WBC (ref 0–0.2)
PLATELET # BLD AUTO: 264 10*3/MM3 (ref 140–450)
RBC # BLD AUTO: 3.87 10*6/MM3 (ref 3.77–5.28)
WBC # BLD AUTO: 4.02 10*3/MM3 (ref 3.4–10.8)

## 2020-10-08 PROCEDURE — 99214 OFFICE O/P EST MOD 30 MIN: CPT | Performed by: NURSE PRACTITIONER

## 2020-10-08 NOTE — PROGRESS NOTES
Chief Complaint   Patient presents with   • Rectal Bleeding       Denisa Holt is a  55 y.o. female here for a follow up visit for rectal bleeding.    HPI  55-year-old female presents today with her daughter on the phone for a follow-up visit for rectal bleeding.  She is a patient of Dr. Gilman.  She was last seen for screening colonoscopy on 12/2018.  At that time she was found to have nonbleeding internal hemorrhoids, external hemorrhoids and a hyperplastic colon polyp.  Patient does report that she has a cousin with cervical cancer.  She denies any other significant GI family history.  She tells me off and on for the last several months she has had issues with rectal bleeding.  She tells me she sometimes bleeds so much she has to wear a pad.  She will feel lumps or bumps on the outside of her rectum and she is not sure if it is hemorrhoids or what she is feeling.  She denies any dysphasia, reflux, abdominal pain, nausea vomiting, diarrhea, constipation or melena.  She denies any rectal pain or pressure.  She tells me it seems like on the days that she eats fruit the next day the bleeding will be worse.  She is not currently doing anything for the suspected hemorrhoid she is not using any over-the-counter remedies.  She admits the rectal bleeding is happening several days a week.  She does have history of cholecystectomy.  She denies any vaginal bleeding.  Past Medical History:   Diagnosis Date   • Asthma    • Hypothyroidism    • Vitamin D deficiency        Past Surgical History:   Procedure Laterality Date   • CHOLECYSTECTOMY  01/01/1993   • COLONOSCOPY     • COLONOSCOPY N/A 12/18/2018    Procedure: COLONOSCOPY TO CECUM WITH POLYPECTOMY;  Surgeon: Delaney Gilman MD;  Location: [facility] ENDOSCOPY;  Service: Gastroenterology   • THYROIDECTOMY  01/01/2003       Scheduled Meds:    Continuous Infusions:No current facility-administered medications for this visit.       PRN Meds:.    No Known Allergies    Social History      Socioeconomic History   • Marital status:      Spouse name: Not on file   • Number of children: Not on file   • Years of education: Not on file   • Highest education level: Not on file   Tobacco Use   • Smoking status: Former Smoker     Packs/day: 0.25     Types: Cigarettes     Start date:      Quit date:      Years since quittin.7   • Smokeless tobacco: Never Used   Substance and Sexual Activity   • Alcohol use: Yes     Comment: wine soicially   • Drug use: No   • Sexual activity: Yes     Partners: Male   Social History Narrative    . Four children.        Family History   Problem Relation Age of Onset   • Hypertension Mother          82   • Stroke Father          72   • Heart attack Father    • Cervical cancer Cousin    • Breast cancer Neg Hx        Review of Systems   Constitutional: Negative for appetite change, chills, diaphoresis, fatigue, fever and unexpected weight change.   HENT: Negative for nosebleeds, postnasal drip, sore throat, trouble swallowing and voice change.    Respiratory: Negative for cough, choking, chest tightness, shortness of breath and wheezing.    Cardiovascular: Negative for chest pain, palpitations and leg swelling.   Gastrointestinal: Positive for anal bleeding. Negative for abdominal distention, abdominal pain, blood in stool, constipation, diarrhea, nausea, rectal pain and vomiting.   Endocrine: Negative for polydipsia, polyphagia and polyuria.   Musculoskeletal: Negative for gait problem.   Skin: Negative for rash and wound.   Allergic/Immunologic: Negative for food allergies.   Neurological: Negative for dizziness, speech difficulty and light-headedness.   Psychiatric/Behavioral: Negative for confusion, self-injury, sleep disturbance and suicidal ideas.       There were no vitals filed for this visit.    Physical Exam  Constitutional:       General: She is not in acute distress.     Appearance: She is well-developed. She is not ill-appearing.    HENT:      Head: Normocephalic.   Eyes:      Pupils: Pupils are equal, round, and reactive to light.   Cardiovascular:      Rate and Rhythm: Normal rate and regular rhythm.      Heart sounds: Normal heart sounds.   Pulmonary:      Effort: Pulmonary effort is normal.      Breath sounds: Normal breath sounds.   Abdominal:      General: Bowel sounds are normal. There is no distension.      Palpations: Abdomen is soft. There is no mass.      Tenderness: There is no abdominal tenderness. There is no guarding or rebound.      Hernia: No hernia is present.      Comments: Rectal exam revealed bleeding hemorrhoids protruding out of the rectum. Tissue was red and inflamed. Actively bleeding.    Musculoskeletal: Normal range of motion.   Skin:     General: Skin is warm and dry.   Neurological:      Mental Status: She is alert and oriented to person, place, and time.   Psychiatric:         Speech: Speech normal.         Behavior: Behavior normal.         Judgment: Judgment normal.         No radiology results for the last 7 days     Assessment and plan     1. Rectal bleeding  - CBC & Differential    2. Other hemorrhoids    3. Hyperplastic colonic polyp, unspecified part of colon    Reviewed colonoscopy results with her today.  She did have medium sized internal hemorrhoids and external hemorrhoids documented at that time.  Today's rectal exam did reveal bleeding internal hemorrhoids that were protruding out of the rectum.  I definitely think she needs a referral to Dr. Destinee Calvillo for further evaluation Will go ahead and check a CBC today.  I will also give her some samples of some hemorrhoidal suppositories and some coupons for the RectiCare cream OTC.  I want her doing daily sitz bath's with apple cider vinegar.  Patient to call the office with any issues.  Patient to call the office back if she has not heard from Dr. Calvillo's office in the next couple of days.  Patient is agreeable to the plan.

## 2020-10-09 ENCOUNTER — TELEPHONE (OUTPATIENT)
Dept: GASTROENTEROLOGY | Facility: CLINIC | Age: 55
End: 2020-10-09

## 2020-10-09 NOTE — TELEPHONE ENCOUNTER
----- Message from LITZY Goldstein sent at 10/9/2020 11:22 AM EDT -----  Please call the patient and let her know her hemoglobin is a little bit lower than 1 month ago it is at 11.3 and it was 12.4 a month ago.  This is most definitely probably from her rectal bleeding.  Has she gotten an appointment yet with Dr. Calvillo?

## 2020-10-13 NOTE — TELEPHONE ENCOUNTER
Call to pt.  Advise per YANN Emery note.  Verb  Understanding.     States has appt with DR Calvillo for 12/21 - first available.  Wondering if should be seen sooner.  Message to YANN Emery.

## 2020-10-14 NOTE — TELEPHONE ENCOUNTER
Patient was bleeding quite a bit when I saw her. Can we see about getting her  seen by Rajinder any sooner? I was quite worried About her. She was bleeding so much she was wearing pads. Thanks.

## 2020-10-19 NOTE — TELEPHONE ENCOUNTER
"Call to pt.  Advise per YANN Emery note.  States otc remedies have helped - bleeding has slowed down.  Notes blood on tissue - not in toilet water.  Denies pain.  Reports \"menstrual type cramps\"    Update to YANN Emery.   "

## 2020-10-19 NOTE — TELEPHONE ENCOUNTER
Please call the patient and check on her.  How is the bleeding?  Did she finally get it to stop with the over-the-counter remedies I was telling her about?

## 2020-10-19 NOTE — TELEPHONE ENCOUNTER
Glad to hear the bleeding has slowed down.  I think if her cramping continues she should follow-up with a GYN just to make sure nothing else reproductive-female wise is going on.

## 2020-10-28 DIAGNOSIS — J45.20 MILD INTERMITTENT REACTIVE AIRWAY DISEASE: ICD-10-CM

## 2020-10-28 RX ORDER — ALBUTEROL SULFATE 90 UG/1
AEROSOL, METERED RESPIRATORY (INHALATION)
Qty: 8.5 G | Refills: 2 | Status: SHIPPED | OUTPATIENT
Start: 2020-10-28

## 2020-10-30 ENCOUNTER — HOSPITAL ENCOUNTER (OUTPATIENT)
Dept: BONE DENSITY | Facility: HOSPITAL | Age: 55
Discharge: HOME | End: 2020-10-30

## 2020-10-30 ENCOUNTER — HOSPITAL ENCOUNTER (OUTPATIENT)
Dept: MAMMOGRAPHY | Facility: HOSPITAL | Age: 55
Discharge: HOME | End: 2020-10-30

## 2020-10-30 ENCOUNTER — APPOINTMENT (OUTPATIENT)
Dept: MAMMOGRAPHY | Facility: HOSPITAL | Age: 55
End: 2020-10-30

## 2020-10-30 DIAGNOSIS — Z12.31 ENCOUNTER FOR SCREENING MAMMOGRAM FOR BREAST CANCER: ICD-10-CM

## 2020-10-30 DIAGNOSIS — N95.1 MENOPAUSAL SYMPTOM: ICD-10-CM

## 2020-10-30 PROCEDURE — 77063 BREAST TOMOSYNTHESIS BI: CPT

## 2020-10-30 PROCEDURE — 77067 SCR MAMMO BI INCL CAD: CPT

## 2020-10-30 PROCEDURE — 77080 DXA BONE DENSITY AXIAL: CPT

## 2020-12-17 RX ORDER — ROSUVASTATIN CALCIUM 20 MG/1
TABLET, COATED ORAL
COMMUNITY
Start: 2020-10-08 | End: 2021-02-01

## 2020-12-18 RX ORDER — ALENDRONATE SODIUM 70 MG/1
70 TABLET ORAL
Qty: 12 TABLET | Refills: 3 | Status: SHIPPED | OUTPATIENT
Start: 2020-12-18 | End: 2021-12-18

## 2020-12-21 ENCOUNTER — OFFICE VISIT (OUTPATIENT)
Dept: SURGERY | Facility: CLINIC | Age: 55
End: 2020-12-21

## 2020-12-21 VITALS
BODY MASS INDEX: 26.82 KG/M2 | TEMPERATURE: 97.8 F | HEIGHT: 65 IN | DIASTOLIC BLOOD PRESSURE: 68 MMHG | OXYGEN SATURATION: 98 % | WEIGHT: 161 LBS | SYSTOLIC BLOOD PRESSURE: 108 MMHG | HEART RATE: 78 BPM

## 2020-12-21 DIAGNOSIS — K64.8 INTERNAL HEMORRHOIDS WITH COMPLICATION: Primary | ICD-10-CM

## 2020-12-21 PROCEDURE — 46600 DIAGNOSTIC ANOSCOPY SPX: CPT | Performed by: COLON & RECTAL SURGERY

## 2020-12-21 PROCEDURE — 99243 OFF/OP CNSLTJ NEW/EST LOW 30: CPT | Performed by: COLON & RECTAL SURGERY

## 2020-12-21 RX ORDER — HYDROCORTISONE 25 MG/G
CREAM TOPICAL
Qty: 30 G | Refills: 1 | Status: SHIPPED | OUTPATIENT
Start: 2020-12-21

## 2020-12-21 RX ORDER — LEVOTHYROXINE SODIUM 150 UG/1
150 TABLET ORAL DAILY
COMMUNITY
Start: 2020-10-26 | End: 2020-12-21 | Stop reason: DRUGHIGH
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
## 2020-12-26 DIAGNOSIS — E55.9 VITAMIN D DEFICIENCY: ICD-10-CM

## 2020-12-26 DIAGNOSIS — E03.9 ACQUIRED HYPOTHYROIDISM: ICD-10-CM

## 2020-12-26 DIAGNOSIS — E04.0 SIMPLE GOITER: ICD-10-CM

## 2020-12-26 DIAGNOSIS — E89.0 H/O THYROIDECTOMY: ICD-10-CM

## 2020-12-28 RX ORDER — ERGOCALCIFEROL 1.25 MG/1
CAPSULE ORAL
Qty: 26 CAPSULE | Refills: 3 | Status: SHIPPED | OUTPATIENT
Start: 2020-12-28 | End: 2022-10-21

## 2020-12-29 ENCOUNTER — TELEPHONE (OUTPATIENT)
Dept: FAMILY MEDICINE CLINIC | Facility: CLINIC | Age: 55
End: 2020-12-29

## 2020-12-29 NOTE — TELEPHONE ENCOUNTER
Caller: Denisa Holt    Relationship: Self    Best call back number: [phone]     What orders are you requesting (i.e. lab or imaging): COVID 19 ANTIBODY LAB    In what timeframe would the patient need to come in:NEXT AVAILABLE  Where will you receive your lab/imaging services: IN OFFICE    Additional notes:MS. KENDRICK IS WANTING TO KNOW IF SHE CAN GET TESTED FOR COVID 19 ANTIBODIES IN OFFICE.     PLEASE ADVISE

## 2021-01-22 RX ORDER — LEVOTHYROXINE SODIUM 150 UG/1
TABLET ORAL
Qty: 90 TABLET | Refills: 3 | Status: SHIPPED | OUTPATIENT
Start: 2021-01-22

## 2021-02-01 RX ORDER — ROSUVASTATIN CALCIUM 20 MG/1
TABLET, COATED ORAL
Qty: 90 TABLET | Refills: 0 | Status: SHIPPED | OUTPATIENT
Start: 2021-02-01 | End: 2021-05-07 | Stop reason: SDUPTHER

## 2021-02-18 DIAGNOSIS — E55.9 VITAMIN D DEFICIENCY: ICD-10-CM

## 2021-02-18 DIAGNOSIS — M81.0 OSTEOPOROSIS, UNSPECIFIED OSTEOPOROSIS TYPE, UNSPECIFIED PATHOLOGICAL FRACTURE PRESENCE: ICD-10-CM

## 2021-02-18 DIAGNOSIS — E03.9 ACQUIRED HYPOTHYROIDISM: Primary | ICD-10-CM

## 2021-03-26 ENCOUNTER — BULK ORDERING (OUTPATIENT)
Dept: CASE MANAGEMENT | Facility: OTHER | Age: 56
End: 2021-03-26

## 2021-03-26 DIAGNOSIS — Z23 IMMUNIZATION DUE: ICD-10-CM

## 2021-04-16 ENCOUNTER — OFFICE VISIT (OUTPATIENT)
Dept: FAMILY MEDICINE CLINIC | Facility: CLINIC | Age: 56
End: 2021-04-16

## 2021-04-16 VITALS
BODY MASS INDEX: 26.33 KG/M2 | HEART RATE: 58 BPM | HEIGHT: 65 IN | WEIGHT: 158 LBS | DIASTOLIC BLOOD PRESSURE: 72 MMHG | SYSTOLIC BLOOD PRESSURE: 102 MMHG | OXYGEN SATURATION: 97 % | TEMPERATURE: 98.6 F | RESPIRATION RATE: 16 BRPM

## 2021-04-16 DIAGNOSIS — D50.9 IRON DEFICIENCY ANEMIA, UNSPECIFIED IRON DEFICIENCY ANEMIA TYPE: ICD-10-CM

## 2021-04-16 DIAGNOSIS — E89.0 POSTOPERATIVE HYPOTHYROIDISM: ICD-10-CM

## 2021-04-16 DIAGNOSIS — Z00.00 HEALTHCARE MAINTENANCE: Primary | ICD-10-CM

## 2021-04-16 DIAGNOSIS — R06.02 SHORTNESS OF BREATH: ICD-10-CM

## 2021-04-16 LAB
BASOPHILS # BLD AUTO: 0.03 10*3/MM3 (ref 0–0.2)
BASOPHILS NFR BLD AUTO: 0.6 % (ref 0–1.5)
EOSINOPHIL # BLD AUTO: 0.17 10*3/MM3 (ref 0–0.4)
EOSINOPHIL NFR BLD AUTO: 3.6 % (ref 0.3–6.2)
ERYTHROCYTE [DISTWIDTH] IN BLOOD BY AUTOMATED COUNT: 12.1 % (ref 12.3–15.4)
FERRITIN SERPL-MCNC: 19 NG/ML (ref 13–150)
HCT VFR BLD AUTO: 40.4 % (ref 34–46.6)
HGB BLD-MCNC: 13 G/DL (ref 12–15.9)
IMM GRANULOCYTES # BLD AUTO: 0.01 10*3/MM3 (ref 0–0.05)
IMM GRANULOCYTES NFR BLD AUTO: 0.2 % (ref 0–0.5)
IRON SATN MFR SERPL: 15 % (ref 20–50)
IRON SERPL-MCNC: 66 MCG/DL (ref 37–145)
LYMPHOCYTES # BLD AUTO: 1.76 10*3/MM3 (ref 0.7–3.1)
LYMPHOCYTES NFR BLD AUTO: 37 % (ref 19.6–45.3)
MCH RBC QN AUTO: 29.8 PG (ref 26.6–33)
MCHC RBC AUTO-ENTMCNC: 32.2 G/DL (ref 31.5–35.7)
MCV RBC AUTO: 92.7 FL (ref 79–97)
MONOCYTES # BLD AUTO: 0.43 10*3/MM3 (ref 0.1–0.9)
MONOCYTES NFR BLD AUTO: 9 % (ref 5–12)
NEUTROPHILS # BLD AUTO: 2.36 10*3/MM3 (ref 1.7–7)
NEUTROPHILS NFR BLD AUTO: 49.6 % (ref 42.7–76)
NRBC BLD AUTO-RTO: 0 /100 WBC (ref 0–0.2)
PLATELET # BLD AUTO: 283 10*3/MM3 (ref 140–450)
RBC # BLD AUTO: 4.36 10*6/MM3 (ref 3.77–5.28)
TIBC SERPL-MCNC: 443 MCG/DL
UIBC SERPL-MCNC: 377 MCG/DL (ref 112–346)
WBC # BLD AUTO: 4.76 10*3/MM3 (ref 3.4–10.8)

## 2021-04-16 PROCEDURE — 99396 PREV VISIT EST AGE 40-64: CPT | Performed by: FAMILY MEDICINE

## 2021-04-16 NOTE — PROGRESS NOTES
"Assessment and Plan:  {Problem List  Visit Diagnosis  Prob List Tab  Medications  SmartSets  BestPractice    Quality:23}     1. Healthcare maintenance  - She is pretty much up to date.     2. Postoperative hypothyroidism  - She has been followed by an endocrinologist and we will send her to Dr. Hartman's group.     3. Probable iron deficiency anemia  - She had blood work done, but I do not have access to that, so we will repeat the CBC and get some iron studies.     4. Chest pain with some shortness of breath  - We will obtain a chest x-ray.     Return in about 1 year (around 4/16/2022) for lab with next visit, Annual physical.      Patient was given instructions and counseling regarding her condition or for health maintenance advice. Please see specific information pulled into the AVS if appropriate.        Denisa is a 55 y.o. being seen today for Annual Exam   Subjective   History of the Present Illness   Annual Exam-Postmenopausal:     Denisa Holt 55 y.o.female presents for annual exam. She complains acutely of chest pain today and denies any injury or trauma. The pain causes shortness of breath which is compounded with mask use. The patient has been using her albuterol inhaler quite often as a result.     She also reports that she is \"severely anemic\" as indicated by lab work done at another office. She has hemorrhoids and states she has to wear pads again because of the bleeding. The patient is taking the \"vegetable iron\" supplements.     Last pap: approximate date 3/2019 and was normal  Last mammogramapproximate date 10/2020 and was normal  The patient is not taking hormone replacement therapy.   The patient wears seatbelts: yes.    The patient has regular exercise: yes.     Exercise: 4 times/week.  Practicing social distancing, handwashing and keeping hands from face? yes  This patient has ever been tested for HepC: yes .   Dental Exam. up to date  history; colonoscopy/sigmoidoscopy: colonoscopy 3 years " ago without abnormalities. She is due in 2 years.   Her immunization are up-to-date.  She is eating a healthy diet.   Labs results were ordered    Social History  She  reports that she quit smoking about 6 years ago. Her smoking use included cigarettes. She started smoking about 33 years ago. She has a 2.50 pack-year smoking history. She has never used smokeless tobacco. She reports current alcohol use. She reports that she does not use drugs.  Objective   Vital Signs          BP Readings from Last 1 Encounters:   04/16/21 102/72     Wt Readings from Last 3 Encounters:   04/16/21 71.7 kg (158 lb)   12/21/20 73 kg (161 lb)   10/08/20 67.8 kg (149 lb 6.4 oz)   Body mass index is 26.29 kg/m².     Physical Exam  Vitals reviewed.   Constitutional:       General: She is not in acute distress.     Appearance: She is well-developed.   HENT:      Head: Normocephalic and atraumatic.      Right Ear: Tympanic membrane, ear canal and external ear normal.      Left Ear: Tympanic membrane, ear canal and external ear normal.      Mouth/Throat:      Comments: Mask in place  Eyes:      Conjunctiva/sclera: Conjunctivae normal.   Neck:      Thyroid: No thyromegaly.      Trachea: No tracheal deviation.   Cardiovascular:      Rate and Rhythm: Normal rate and regular rhythm.      Heart sounds: Normal heart sounds.   Pulmonary:      Effort: Pulmonary effort is normal. No respiratory distress.      Breath sounds: Normal breath sounds. No wheezing or rales.   Chest:      Breasts: Breasts are symmetrical.         Right: No mass.         Left: No mass.   Abdominal:      General: Bowel sounds are normal. There is no distension.      Palpations: Abdomen is soft.      Tenderness: There is no abdominal tenderness.   Musculoskeletal:         General: No deformity.      Cervical back: Normal range of motion and neck supple.      Right lower leg: No edema.      Left lower leg: No edema.   Lymphadenopathy:      Cervical: No cervical adenopathy.    Skin:     General: Skin is warm and dry.   Neurological:      Mental Status: She is alert and oriented to person, place, and time.   Psychiatric:         Behavior: Behavior normal.         Thought Content: Thought content normal.         Judgment: Judgment normal.                 Scribed for Aleja Robert MD by Christa Zamarripa.  04/16/21   10:09 EDT    I have personally performed the services described in this document as scribed by the above individual, and it is both accurate and complete.  Aleja Robert MD  4/16/2021  15:38 EDT

## 2021-05-07 RX ORDER — ROSUVASTATIN CALCIUM 20 MG/1
20 TABLET, COATED ORAL
Qty: 30 TABLET | Refills: 0 | Status: SHIPPED | OUTPATIENT
Start: 2021-05-07 | End: 2022-10-21

## 2021-09-23 ENCOUNTER — TRANSCRIBE ORDERS (OUTPATIENT)
Dept: ADMINISTRATIVE | Facility: HOSPITAL | Age: 56
End: 2021-09-23

## 2021-09-23 DIAGNOSIS — Z12.31 BREAST CANCER SCREENING BY MAMMOGRAM: Primary | ICD-10-CM

## 2021-11-12 RX ORDER — CETIRIZINE HYDROCHLORIDE 10 MG/1
10 TABLET ORAL DAILY
Qty: 90 TABLET | Refills: 3 | OUTPATIENT
Start: 2021-11-12

## 2021-11-29 ENCOUNTER — HOSPITAL ENCOUNTER (OUTPATIENT)
Dept: MAMMOGRAPHY | Facility: HOSPITAL | Age: 56
Discharge: HOME | End: 2021-11-29
Admitting: FAMILY MEDICINE

## 2021-11-29 DIAGNOSIS — Z12.31 BREAST CANCER SCREENING BY MAMMOGRAM: ICD-10-CM

## 2021-11-29 PROCEDURE — 77067 SCR MAMMO BI INCL CAD: CPT

## 2021-11-29 PROCEDURE — 77063 BREAST TOMOSYNTHESIS BI: CPT

## 2021-12-03 ENCOUNTER — TELEPHONE (OUTPATIENT)
Dept: FAMILY MEDICINE CLINIC | Facility: CLINIC | Age: 56
End: 2021-12-03

## 2021-12-03 NOTE — TELEPHONE ENCOUNTER
PATIENT CALLED AND STATES SHE REQUESTED A DEXA SCAN TO BE SCHEDULED THE SAME TIME HAS HER MAMMOGRAM. THE MAMMOGRAM WAS DONE BUT THE DEXA SCAN WAS NOT SCHEDULED. SHE IS REQUESTING A DEXA SCAN TO BE DONE AT [facility] BEFORE 12/15/21   FOR SHE WILL BE LEAVING THE COUNTRY 12/15/21       CALL BACK NUMBER [phone]   Vomiting is very common in children. Vomiting causes food and liquid to come up from the stomach and out of the mouth or nose. Vomiting can cause your child to lose too much fluid and salt from his body. This is called dehydration. Dehydration can be a dangerous condition for your child. When a child is dehydrated, his body and organs such as the heart may not work normally. You can help prevent your child from becoming dehydrated by giving him enough liquids to replace vomited fluid. It is important to call your child's caregiver if you think your child is becoming dehydrated.  There are many causes of vomiting. A common cause in children over one year old is gastroenteritis, or the "stomach flu". The stomach flu is caused by germs that infect the lining of the stomach and intestines. Other causes of vomiting are problems with the muscles surrounding your baby's stomach. These problems may be called pyloric stenosis or gastroesophageal reflux disease (GERD). Your child may also have vomiting because of food poisoning, infections in other body organs, or a head injury. Sometimes, the cause of your child's vomiting is unknown.  Picture of the digestive system of a child  AFTER YOU LEAVE:  Medicines:  Keep a current list of your child's medicines: Include the amounts, and when, how, and why they are taken. Bring the list and the medicines in their containers to follow-up visits. Carry your child's medicine list with you in case of an emergency. Throw away old medicine lists. Give vitamins, herbs, or food supplements only as directed.  Give your child's medicine as directed: Call your child's primary healthcare provider if you think the medicine is not working as expected. Tell him if your child is allergic to any medicine. Ask before you change or stop giving your child his medicines.  Do not give your child any over-the-counter (OTC) medicines for his vomiting unless his caregiver tells you to. If you are told to give your child a medicine, follow the caregiver's instructions carefully.  How can I take care of my child at home?  Help your child to rest until he feels better.  Call your child's caregiver if your child shows signs of dehydration.  A baby may be dehydrated if he wets five or less diapers during a 24 hour time period. A dehydrated baby may have a dry mouth and cracked lips, and may cry with few or no tears. A baby with worsening dehydration may act sleepier, weaker, or fussier than usual. The baby's eyes and soft spot on top of his head may be sunken if he is dehydrated. He may also have wrinkled skin, and pale hands and feet.  A child may be dehydrated if he has a dry mouth, cracked lips, cries without tears, or is dizzy. A dehydrated child may be sleepier, fussier, and weaker than usual. He may be very thirsty and will urinate less often than usual.  Give your child plenty of liquids.  The best way to prevent dehydration is to give your child plenty of fluids, even if he is still occasionally vomiting. The best fluids to give your child contain a mixture of salt, sugar, minerals, and nutrients in water. These are called oral rehydration solutions (ORS). Many brands are available at [employer] stores. Ask your child's caregiver which brand you should buy.  Give your baby 1 to 2 teaspoons of ORS every five minutes. Older children can begin with small sips of ORS often. Use a spoon, syringe, cup, or bottle to feed ORS to your child. If your child does not vomit the ORS, slowly give your child more ORS. Encourage but do not force your child to drink.  Continue giving your baby formula or breast milk throughout his illness, or follow his caregiver's instructions. Your child can start eating foods when he is ready. Start slowly with bland food such as cooked cereal, rice, noodles, bananas, crackers, applesauce, or toast. If he does not have problems with soft, bland foods, slowly begin to serve him regular foods.  Put your baby or young child on his stomach or side whenever he is lying down. This may stop him from breathing vomit into his airways and lungs.  Save your extra breast milk. If you are breast feeding your child, keep offering him breast milk. If your child is drinking less than usual, pump your breasts after feedings. Store the extra milk in the freezer so that your child can drink it later. Ask your child's caregiver for information about pumping, storing, and freezing your breast milk.  Wash your and your child's hands often with soap and warm water. Handwashing may help you and your child to prevent spreading germs to others. Wash your hands after changing diapers and before fixing food. Your child and all family members should wash their hands before touching food and eating. Everyone should wash their hands after going to the bathroom.

## 2021-12-06 ENCOUNTER — TELEPHONE (OUTPATIENT)
Dept: FAMILY MEDICINE CLINIC | Facility: CLINIC | Age: 56
End: 2021-12-06

## 2021-12-06 NOTE — TELEPHONE ENCOUNTER
Even on medication, it takes years for changes to occur and standard recommendation is to do a DEXA every two years, no sooner except in extraordinary circumstances. .

## 2021-12-06 NOTE — TELEPHONE ENCOUNTER
Patient called today asking about DEXA bone scan. See previous note from 12/3/21 related to this. Patient states she has osteoporosis and states that she is to have them every 2 years. States that she is concerned that this is not being ordered. Patient requesting call back to discuss why this is not being ordered.

## 2022-06-23 ENCOUNTER — TELEPHONE (OUTPATIENT)
Dept: FAMILY MEDICINE CLINIC | Facility: CLINIC | Age: 57
End: 2022-06-23

## 2022-06-23 DIAGNOSIS — Z78.0 POST-MENOPAUSAL: Primary | ICD-10-CM

## 2022-06-23 NOTE — TELEPHONE ENCOUNTER
Caller: Denisa Holt    Relationship: Self    Best call back number: [phone]    What orders are you requesting (i.e. lab or imaging): ORDERS FOR COLONOSCOPY & BONE DENSITY SCAN     Where will you receive your lab/imaging services: [facility]

## 2022-10-04 DIAGNOSIS — R53.83 FATIGUE, UNSPECIFIED TYPE: ICD-10-CM

## 2022-10-04 DIAGNOSIS — E78.2 MIXED DYSLIPIDEMIA: Primary | ICD-10-CM

## 2022-10-04 DIAGNOSIS — Z13.1 DIABETES MELLITUS SCREENING: ICD-10-CM

## 2022-10-04 DIAGNOSIS — E89.0 POSTOPERATIVE HYPOTHYROIDISM: ICD-10-CM

## 2022-10-20 NOTE — PROGRESS NOTES
Assessment and Plan     Patient Instructions    Problem List Items Addressed This Visit    None  Visit Diagnoses     Healthcare maintenance    -  Primary    Relevant Orders    CBC (No Diff)    Comprehensive Metabolic Panel    Lipid Panel With LDL / HDL Ratio    TSH    T4, free    Iron and TIBC    Encounter for screening for other disorder        Relevant Orders    CBC (No Diff)    Comprehensive Metabolic Panel    Lipid Panel With LDL / HDL Ratio    TSH    T4, free    Iron and TIBC                 Denisa is a 57 y.o. being seen today for  Annual Exam   Subjective   History of the Present Illness   Annual Exam-Postmenopausal:     Denisa Holt 57 y.o.female presents for annual exam.     Last pap: approximate date 2019 and was normal  Last mammogramapproximate date 11/21 and was normal  The patient is not taking hormone replacement therapy.   The patient wears seatbelts: yes.    The patient has regular exercise: yes.     Exercise: 5 times/week.  This patient has ever been tested for HepC: yes .   Dental Exam. up to date  history; colonoscopy/sigmoidoscopy: colonoscopy 4 years ago with abnormalities.  Her immunization are up-to-date.  She is eating a healthy diet.   Labs results were ordered    Social History  She  reports that she quit smoking about 7 years ago. Her smoking use included cigarettes. She started smoking about 34 years ago. She has a 2.50 pack-year smoking history. She has never used smokeless tobacco. She reports current alcohol use. She reports that she does not use drugs.  Objective   Vital Signs        BP Readings from Last 1 Encounters:   10/21/22 112/72     Wt Readings from Last 3 Encounters:   10/21/22 68.9 kg (152 lb)   04/16/21 71.7 kg (158 lb)   12/21/20 73 kg (161 lb)   Body mass index is 25.29 kg/m².     Physical Exam  Vitals reviewed.   Constitutional:       General: She is not in acute distress.     Appearance: She is well-developed.   HENT:      Head: Normocephalic and atraumatic.      Right  Ear: Tympanic membrane, ear canal and external ear normal.      Left Ear: Tympanic membrane, ear canal and external ear normal.   Eyes:      Conjunctiva/sclera: Conjunctivae normal.   Neck:      Thyroid: No thyromegaly.      Trachea: No tracheal deviation.   Cardiovascular:      Rate and Rhythm: Normal rate and regular rhythm.      Heart sounds: Normal heart sounds. No murmur heard.    No gallop.   Pulmonary:      Effort: Pulmonary effort is normal. No respiratory distress.      Breath sounds: Normal breath sounds. No wheezing or rales.   Chest:      Chest wall: No tenderness.   Breasts:     Breasts are symmetrical.      Right: No mass, nipple discharge or skin change.      Left: No mass, nipple discharge or skin change.   Abdominal:      General: Bowel sounds are normal. There is no distension.      Palpations: Abdomen is soft.      Tenderness: There is no abdominal tenderness.   Genitourinary:     Exam position: Supine.      Labia:         Right: No rash or lesion.         Left: No rash or lesion.       Vagina: Normal. No vaginal discharge, erythema, tenderness or bleeding.      Cervix: No cervical motion tenderness, discharge or friability.      Uterus: Not enlarged and not tender.       Adnexa:         Right: No mass, tenderness or fullness.          Left: No mass, tenderness or fullness.        Rectum: External hemorrhoid present.   Musculoskeletal:         General: No deformity.      Cervical back: Normal range of motion and neck supple.   Lymphadenopathy:      Cervical: No cervical adenopathy.   Skin:     General: Skin is warm and dry.      Findings: No rash.   Neurological:      Mental Status: She is alert and oriented to person, place, and time.   Psychiatric:         Behavior: Behavior normal.         Thought Content: Thought content normal.         Judgment: Judgment normal.               Patient was given instructions and counseling regarding her condition or for health maintenance advice. Please see  specific information pulled into the AVS if appropriate.

## 2022-10-21 ENCOUNTER — OFFICE VISIT (OUTPATIENT)
Dept: FAMILY MEDICINE CLINIC | Facility: CLINIC | Age: 57
End: 2022-10-21

## 2022-10-21 VITALS
OXYGEN SATURATION: 99 % | BODY MASS INDEX: 25.33 KG/M2 | SYSTOLIC BLOOD PRESSURE: 112 MMHG | WEIGHT: 152 LBS | RESPIRATION RATE: 18 BRPM | DIASTOLIC BLOOD PRESSURE: 72 MMHG | HEIGHT: 65 IN | HEART RATE: 78 BPM

## 2022-10-21 DIAGNOSIS — Z00.00 HEALTHCARE MAINTENANCE: Primary | ICD-10-CM

## 2022-10-21 DIAGNOSIS — Z12.4 SCREENING FOR MALIGNANT NEOPLASM OF THE CERVIX: ICD-10-CM

## 2022-10-21 DIAGNOSIS — Z13.89 ENCOUNTER FOR SCREENING FOR OTHER DISORDER: ICD-10-CM

## 2022-10-21 PROCEDURE — 99396 PREV VISIT EST AGE 40-64: CPT | Performed by: FAMILY MEDICINE

## 2022-10-21 RX ORDER — PRAVASTATIN SODIUM 40 MG
TABLET ORAL
COMMUNITY
Start: 2022-09-28

## 2022-10-22 LAB
ALBUMIN SERPL-MCNC: 4.7 G/DL (ref 3.5–5.2)
ALBUMIN/GLOB SERPL: 2.1 G/DL
ALP SERPL-CCNC: 58 U/L (ref 39–117)
ALT SERPL-CCNC: 25 U/L (ref 1–33)
AST SERPL-CCNC: 26 U/L (ref 1–32)
BILIRUB SERPL-MCNC: 0.4 MG/DL (ref 0–1.2)
BUN SERPL-MCNC: 12 MG/DL (ref 6–20)
BUN/CREAT SERPL: 18.8 (ref 7–25)
CALCIUM SERPL-MCNC: 9.9 MG/DL (ref 8.6–10.5)
CHLORIDE SERPL-SCNC: 99 MMOL/L (ref 98–107)
CHOLEST SERPL-MCNC: 219 MG/DL (ref 0–200)
CO2 SERPL-SCNC: 27.5 MMOL/L (ref 22–29)
CREAT SERPL-MCNC: 0.64 MG/DL (ref 0.57–1)
EGFRCR SERPLBLD CKD-EPI 2021: 101.9 ML/MIN/1.73
ERYTHROCYTE [DISTWIDTH] IN BLOOD BY AUTOMATED COUNT: 12.3 % (ref 12.3–15.4)
GLOBULIN SER CALC-MCNC: 2.2 GM/DL
GLUCOSE SERPL-MCNC: 79 MG/DL (ref 65–99)
HCT VFR BLD AUTO: 39.6 % (ref 34–46.6)
HDLC SERPL-MCNC: 75 MG/DL (ref 40–60)
HGB BLD-MCNC: 13.1 G/DL (ref 12–15.9)
IRON SATN MFR SERPL: 22 % (ref 20–50)
IRON SERPL-MCNC: 98 MCG/DL (ref 37–145)
LDLC SERPL CALC-MCNC: 123 MG/DL (ref 0–100)
LDLC/HDLC SERPL: 1.59 {RATIO}
MCH RBC QN AUTO: 29.7 PG (ref 26.6–33)
MCHC RBC AUTO-ENTMCNC: 33.1 G/DL (ref 31.5–35.7)
MCV RBC AUTO: 89.8 FL (ref 79–97)
PLATELET # BLD AUTO: 257 10*3/MM3 (ref 140–450)
POTASSIUM SERPL-SCNC: 3.9 MMOL/L (ref 3.5–5.2)
PROT SERPL-MCNC: 6.9 G/DL (ref 6–8.5)
RBC # BLD AUTO: 4.41 10*6/MM3 (ref 3.77–5.28)
SODIUM SERPL-SCNC: 138 MMOL/L (ref 136–145)
T4 FREE SERPL-MCNC: 2.12 NG/DL (ref 0.93–1.7)
TIBC SERPL-MCNC: 444 MCG/DL
TRIGL SERPL-MCNC: 123 MG/DL (ref 0–150)
TSH SERPL DL<=0.005 MIU/L-ACNC: 0.23 UIU/ML (ref 0.27–4.2)
UIBC SERPL-MCNC: 346 MCG/DL (ref 112–346)
VLDLC SERPL CALC-MCNC: 21 MG/DL (ref 5–40)
WBC # BLD AUTO: 5.83 10*3/MM3 (ref 3.4–10.8)

## 2022-10-26 ENCOUNTER — TELEPHONE (OUTPATIENT)
Dept: FAMILY MEDICINE CLINIC | Facility: CLINIC | Age: 57
End: 2022-10-26

## 2022-10-26 NOTE — TELEPHONE ENCOUNTER
Caller: Denisa Holt    Relationship: Self    Best call back number:     Caller requesting test results:     What test was performed: LAB    When was the test performed: LAST WEEK    Where was the test performed: [facility]    Additional notes: PATIENT IS CALLING IN TO GET HER RESULTS FROM HER LAST LAB TEST.

## 2022-10-28 ENCOUNTER — OFFICE VISIT (OUTPATIENT)
Dept: FAMILY MEDICINE CLINIC | Facility: CLINIC | Age: 57
End: 2022-10-28

## 2022-10-28 VITALS
HEART RATE: 58 BPM | WEIGHT: 152 LBS | DIASTOLIC BLOOD PRESSURE: 62 MMHG | SYSTOLIC BLOOD PRESSURE: 102 MMHG | RESPIRATION RATE: 16 BRPM | HEIGHT: 65 IN | OXYGEN SATURATION: 99 % | BODY MASS INDEX: 25.33 KG/M2

## 2022-10-28 DIAGNOSIS — M54.12 RIGHT CERVICAL RADICULOPATHY: Primary | ICD-10-CM

## 2022-10-28 LAB
CYTOLOGIST CVX/VAG CYTO: NORMAL
CYTOLOGY CVX/VAG DOC CYTO: NORMAL
CYTOLOGY CVX/VAG DOC THIN PREP: NORMAL
DX ICD CODE: NORMAL
HIV 1 & 2 AB SER-IMP: NORMAL
HPV GENOTYPE REFLEX: NORMAL
HPV I/H RISK 4 DNA CVX QL PROBE+SIG AMP: NEGATIVE
OTHER STN SPEC: NORMAL
STAT OF ADQ CVX/VAG CYTO-IMP: NORMAL

## 2022-10-28 PROCEDURE — 99212 OFFICE O/P EST SF 10 MIN: CPT | Performed by: FAMILY MEDICINE

## 2022-10-28 RX ORDER — MONTELUKAST SODIUM 10 MG/1
TABLET ORAL
COMMUNITY
Start: 2022-10-27 | End: 2023-02-13

## 2022-10-28 NOTE — PROGRESS NOTES
Assessment and Plan     Problem List Items Addressed This Visit    None  Visit Diagnoses     Right cervical radiculopathy    -  Primary    Relevant Orders    Ambulatory Referral to Physical Therapy Evaluate and treat (Completed)          Patient was given instructions and counseling regarding her condition or for health maintenance advice. Please see specific information pulled into the AVS if appropriate.        Deinsa is a 57 y.o. being seen today for  Pain (Right shoulder)   Subjective   History of the Present Illness   Patient complains of right shoulder pain.  This is been associated with some numbness and tingling in her right hand.  Began about a week ago.  Social History  She  reports that she quit smoking about 7 years ago. Her smoking use included cigarettes. She started smoking about 34 years ago. She has a 2.50 pack-year smoking history. She has never used smokeless tobacco. She reports current alcohol use. She reports that she does not use drugs.  Objective   Vital Signs        BP Readings from Last 1 Encounters:   10/28/22 102/62     Wt Readings from Last 3 Encounters:   10/28/22 68.9 kg (152 lb)   10/21/22 68.9 kg (152 lb)   04/16/21 71.7 kg (158 lb)   Body mass index is 25.29 kg/m².     Physical Exam  Vitals reviewed.   Constitutional:       Appearance: Normal appearance. She is well-developed.   Musculoskeletal:         General: Tenderness (Both AC joint and ant biceps tendon) present.   Neurological:      Mental Status: She is alert.   Psychiatric:         Behavior: Behavior normal.         Thought Content: Thought content normal.         Judgment: Judgment normal.

## 2022-10-31 ENCOUNTER — HOSPITAL ENCOUNTER (OUTPATIENT)
Dept: BONE DENSITY | Facility: HOSPITAL | Age: 57
Discharge: HOME | End: 2022-10-31
Admitting: FAMILY MEDICINE

## 2022-10-31 ENCOUNTER — TELEPHONE (OUTPATIENT)
Dept: FAMILY MEDICINE CLINIC | Facility: CLINIC | Age: 57
End: 2022-10-31

## 2022-10-31 PROCEDURE — 77080 DXA BONE DENSITY AXIAL: CPT

## 2022-11-28 ENCOUNTER — TELEPHONE (OUTPATIENT)
Dept: FAMILY MEDICINE CLINIC | Facility: CLINIC | Age: 57
End: 2022-11-28

## 2022-11-28 DIAGNOSIS — M54.12 RIGHT CERVICAL RADICULOPATHY: Primary | ICD-10-CM

## 2022-11-28 NOTE — TELEPHONE ENCOUNTER
Caller: Denisa Holt    Relationship: Self    Best call back number: [phone]    What is the medical concern/diagnosis: REFERRAL ORDER FOR AN MRI    What specialty or service is being requested: PATIENT IS STILL HAVING TINGLING IN HER ARM AND HAS BEEN IN PHYSICAL THERAPY FOR A MONTH AND THE SYMPTOMS AREN'T BETTER.    What is the provider, practice or medical service name: [facility]

## 2023-01-03 ENCOUNTER — APPOINTMENT (OUTPATIENT)
Dept: MRI IMAGING | Facility: HOSPITAL | Age: 58
End: 2023-01-03

## 2023-01-05 ENCOUNTER — HOSPITAL ENCOUNTER (OUTPATIENT)
Dept: MRI IMAGING | Facility: HOSPITAL | Age: 58
Discharge: HOME | End: 2023-01-05
Admitting: FAMILY MEDICINE
Payer: COMMERCIAL

## 2023-01-05 DIAGNOSIS — M54.12 RIGHT CERVICAL RADICULOPATHY: ICD-10-CM

## 2023-01-05 PROCEDURE — 72141 MRI NECK SPINE W/O DYE: CPT

## 2023-01-13 ENCOUNTER — TELEPHONE (OUTPATIENT)
Dept: FAMILY MEDICINE CLINIC | Facility: CLINIC | Age: 58
End: 2023-01-13

## 2023-01-13 NOTE — TELEPHONE ENCOUNTER
Caller: Yanet, Denisa    Relationship: Self    Best call back number: [phone]    What test was performed: MRI CERVICAL SPINE    When was the test performed: 1/5/23    Where was the test performed: [facility]    Additional notes: PATIENT CALLED WANTING TO GO OVER TEST RESULTS, SHE STATES SHE WILL BE IN MEETING ALL DAY BUT IF A DETAILED [employer] MESSAGE CAN BE SENT TO HER EXPLAINING TEST RESULTS.    PLEASE ADVISE

## 2023-01-16 DIAGNOSIS — M54.12 RIGHT CERVICAL RADICULOPATHY: Primary | ICD-10-CM

## 2023-02-13 ENCOUNTER — OFFICE VISIT (OUTPATIENT)
Dept: FAMILY MEDICINE CLINIC | Facility: CLINIC | Age: 58
End: 2023-02-13
Payer: COMMERCIAL

## 2023-02-13 VITALS
RESPIRATION RATE: 18 BRPM | SYSTOLIC BLOOD PRESSURE: 122 MMHG | DIASTOLIC BLOOD PRESSURE: 80 MMHG | HEIGHT: 65 IN | BODY MASS INDEX: 25.29 KG/M2

## 2023-02-13 DIAGNOSIS — R09.81 CONGESTION OF NASAL SINUS: Primary | ICD-10-CM

## 2023-02-13 DIAGNOSIS — R05.1 ACUTE COUGH: ICD-10-CM

## 2023-02-13 LAB
EXPIRATION DATE: NORMAL
FLUAV AG UPPER RESP QL IA.RAPID: NOT DETECTED
FLUBV AG UPPER RESP QL IA.RAPID: NOT DETECTED
INTERNAL CONTROL: NORMAL
Lab: NORMAL
SARS-COV-2 AG UPPER RESP QL IA.RAPID: NOT DETECTED

## 2023-02-13 PROCEDURE — 87428 SARSCOV & INF VIR A&B AG IA: CPT | Performed by: FAMILY MEDICINE

## 2023-02-13 PROCEDURE — 99213 OFFICE O/P EST LOW 20 MIN: CPT | Performed by: FAMILY MEDICINE

## 2023-02-13 RX ORDER — BENZONATATE 100 MG/1
100 CAPSULE ORAL 3 TIMES DAILY PRN
Qty: 30 CAPSULE | Refills: 0 | Status: SHIPPED | OUTPATIENT
Start: 2023-02-13

## 2023-02-13 RX ORDER — CYCLOBENZAPRINE HCL 5 MG
TABLET ORAL
COMMUNITY
Start: 2022-11-30

## 2023-02-13 NOTE — PROGRESS NOTES
"Chief Complaint  Cough, Nasal Congestion, and Generalized Body Aches (Pt states all symptoms for six days)    Subjective    History of Present Illness {CC  Problem List  Visit  Diagnosis   Encounters  Notes  Medications  Labs  Result Review Imaging  Media :23}     Denisa Holt presents to [facility] for Cough, Nasal Congestion, and Generalized Body Aches (Pt states all symptoms for six days).  History of Present Illness     Here today with symptoms as above. Frustrated because she feels that the cough is not improving very fast or at all. No recent fevers or chills though she did have some body aches initially. No known sick contacts. Is up-to-date with COVID vaccines and flu shot. Cough is dry and slowly improving.    Objective     Vital Signs:   /80   Resp 18   Ht 165.1 cm (65\")   BMI 25.29 kg/m²   Physical Exam  Vitals and nursing note reviewed.   Constitutional:       General: She is not in acute distress.     Appearance: Normal appearance. She is not ill-appearing.   HENT:      Right Ear: Hearing, tympanic membrane, ear canal and external ear normal.      Left Ear: Hearing, tympanic membrane, ear canal and external ear normal.      Nose: Mucosal edema and congestion present.      Mouth/Throat:      Mouth: Mucous membranes are moist.      Pharynx: Oropharynx is clear. No oropharyngeal exudate or posterior oropharyngeal erythema.   Cardiovascular:      Rate and Rhythm: Normal rate and regular rhythm.      Pulses: Normal pulses.      Heart sounds: Normal heart sounds. No murmur heard.  Pulmonary:      Effort: Pulmonary effort is normal. No respiratory distress.      Breath sounds: Normal breath sounds. No rales.   Neurological:      Mental Status: She is alert and oriented to person, place, and time. Mental status is at baseline.   Psychiatric:         Mood and Affect: Mood normal.         Behavior: Behavior normal.          Result Review  Data Reviewed:{ Labs  " Result Review  Imaging  Med Tab  Media :23}                   Assessment and Plan {CC Problem List  Visit Diagnosis  ROS  Review (Popup)  Health Maintenance  Quality  BestPractice  Medications  SmartSets  SnapShot Encounters  Media :23}   Diagnoses and all orders for this visit:    1. Congestion of nasal sinus (Primary)  -     POCT SARS-CoV-2 Antigen MONIKA    2. Acute cough  -     POCT SARS-CoV-2 Antigen MONIKA  -     benzonatate (Tessalon Perles) 100 MG capsule; Take 1 capsule by mouth 3 (Three) Times a Day As Needed for Cough.  Dispense: 30 capsule; Refill: 0    Orders as above. Point-of-care COVID and flu screen is negative. We will treat symptomatically with benzonatate capsules. Discussed symptom management otherwise. Anticipate resolution with time.    Recommended follow-up as below. Encouraged communication via [employer] in the meantime.    Patient was given instructions and counseling regarding her condition or for health maintenance advice. Please see specific information pulled into the AVS (placed there by myself) if appropriate.    Return if symptoms worsen or fail to improve.      ROBBIE Curry MD

## 2023-09-25 ENCOUNTER — HOSPITAL ENCOUNTER (EMERGENCY)
Facility: HOSPITAL | Age: 58
Discharge: HOME | End: 2023-09-25
Attending: STUDENT IN AN ORGANIZED HEALTH CARE EDUCATION/TRAINING PROGRAM | Admitting: STUDENT IN AN ORGANIZED HEALTH CARE EDUCATION/TRAINING PROGRAM
Payer: COMMERCIAL

## 2023-09-25 ENCOUNTER — APPOINTMENT (OUTPATIENT)
Dept: CT IMAGING | Facility: HOSPITAL | Age: 58
End: 2023-09-25
Payer: COMMERCIAL

## 2023-09-25 VITALS
HEIGHT: 65 IN | DIASTOLIC BLOOD PRESSURE: 62 MMHG | TEMPERATURE: 98.1 F | SYSTOLIC BLOOD PRESSURE: 98 MMHG | OXYGEN SATURATION: 98 % | RESPIRATION RATE: 18 BRPM | HEART RATE: 68 BPM | BODY MASS INDEX: 23.32 KG/M2 | WEIGHT: 140 LBS

## 2023-09-25 DIAGNOSIS — R55 SYNCOPE AND COLLAPSE: Primary | ICD-10-CM

## 2023-09-25 LAB
ALBUMIN SERPL-MCNC: 4.1 G/DL (ref 3.5–5.2)
ALBUMIN/GLOB SERPL: 1.6 G/DL
ALP SERPL-CCNC: 53 U/L (ref 39–117)
ALT SERPL W P-5'-P-CCNC: 24 U/L (ref 1–33)
ANION GAP SERPL CALCULATED.3IONS-SCNC: 10.9 MMOL/L (ref 5–15)
AST SERPL-CCNC: 28 U/L (ref 1–32)
BASOPHILS # BLD AUTO: 0.04 10*3/MM3 (ref 0–0.2)
BASOPHILS NFR BLD AUTO: 0.4 % (ref 0–1.5)
BILIRUB SERPL-MCNC: <0.2 MG/DL (ref 0–1.2)
BUN SERPL-MCNC: 14 MG/DL (ref 8–23)
BUN/CREAT SERPL: 21.9 (ref 7–25)
CALCIUM SPEC-SCNC: 8.8 MG/DL (ref 8.6–10.5)
CHLORIDE SERPL-SCNC: 102 MMOL/L (ref 98–107)
CO2 SERPL-SCNC: 22.1 MMOL/L (ref 22–29)
CREAT SERPL-MCNC: 0.64 MG/DL (ref 0.57–1)
DEPRECATED RDW RBC AUTO: 39.5 FL (ref 37–54)
EGFRCR SERPLBLD CKD-EPI 2021: 101.3 ML/MIN/1.73
EOSINOPHIL # BLD AUTO: 0.07 10*3/MM3 (ref 0–0.4)
EOSINOPHIL NFR BLD AUTO: 0.7 % (ref 0.3–6.2)
ERYTHROCYTE [DISTWIDTH] IN BLOOD BY AUTOMATED COUNT: 12 % (ref 12.3–15.4)
GLOBULIN UR ELPH-MCNC: 2.6 GM/DL
GLUCOSE SERPL-MCNC: 89 MG/DL (ref 65–99)
HCT VFR BLD AUTO: 35.2 % (ref 34–46.6)
HGB BLD-MCNC: 11.7 G/DL (ref 12–15.9)
HOLD SPECIMEN: NORMAL
HOLD SPECIMEN: NORMAL
IMM GRANULOCYTES # BLD AUTO: 0.03 10*3/MM3 (ref 0–0.05)
IMM GRANULOCYTES NFR BLD AUTO: 0.3 % (ref 0–0.5)
LYMPHOCYTES # BLD AUTO: 1.33 10*3/MM3 (ref 0.7–3.1)
LYMPHOCYTES NFR BLD AUTO: 14 % (ref 19.6–45.3)
MAGNESIUM SERPL-MCNC: 1.8 MG/DL (ref 1.6–2.4)
MCH RBC QN AUTO: 30.1 PG (ref 26.6–33)
MCHC RBC AUTO-ENTMCNC: 33.2 G/DL (ref 31.5–35.7)
MCV RBC AUTO: 90.5 FL (ref 79–97)
MONOCYTES # BLD AUTO: 0.76 10*3/MM3 (ref 0.1–0.9)
MONOCYTES NFR BLD AUTO: 8 % (ref 5–12)
NEUTROPHILS NFR BLD AUTO: 7.27 10*3/MM3 (ref 1.7–7)
NEUTROPHILS NFR BLD AUTO: 76.6 % (ref 42.7–76)
NRBC BLD AUTO-RTO: 0 /100 WBC (ref 0–0.2)
PLATELET # BLD AUTO: 246 10*3/MM3 (ref 140–450)
PMV BLD AUTO: 9.8 FL (ref 6–12)
POTASSIUM SERPL-SCNC: 3.3 MMOL/L (ref 3.5–5.2)
PROT SERPL-MCNC: 6.7 G/DL (ref 6–8.5)
RBC # BLD AUTO: 3.89 10*6/MM3 (ref 3.77–5.28)
SODIUM SERPL-SCNC: 135 MMOL/L (ref 136–145)
TROPONIN T SERPL HS-MCNC: 7 NG/L
WBC NRBC COR # BLD: 9.5 10*3/MM3 (ref 3.4–10.8)
WHOLE BLOOD HOLD COAG: NORMAL
WHOLE BLOOD HOLD SPECIMEN: NORMAL

## 2023-09-25 PROCEDURE — 96374 THER/PROPH/DIAG INJ IV PUSH: CPT

## 2023-09-25 PROCEDURE — 93005 ELECTROCARDIOGRAM TRACING: CPT | Performed by: STUDENT IN AN ORGANIZED HEALTH CARE EDUCATION/TRAINING PROGRAM

## 2023-09-25 PROCEDURE — 80053 COMPREHEN METABOLIC PANEL: CPT | Performed by: EMERGENCY MEDICINE

## 2023-09-25 PROCEDURE — 84484 ASSAY OF TROPONIN QUANT: CPT | Performed by: EMERGENCY MEDICINE

## 2023-09-25 PROCEDURE — 83735 ASSAY OF MAGNESIUM: CPT | Performed by: EMERGENCY MEDICINE

## 2023-09-25 PROCEDURE — 70450 CT HEAD/BRAIN W/O DYE: CPT

## 2023-09-25 PROCEDURE — 36415 COLL VENOUS BLD VENIPUNCTURE: CPT

## 2023-09-25 PROCEDURE — 99284 EMERGENCY DEPT VISIT MOD MDM: CPT

## 2023-09-25 PROCEDURE — 85025 COMPLETE CBC W/AUTO DIFF WBC: CPT | Performed by: EMERGENCY MEDICINE

## 2023-09-25 PROCEDURE — 93005 ELECTROCARDIOGRAM TRACING: CPT

## 2023-09-25 RX ORDER — SODIUM CHLORIDE 0.9 % (FLUSH) 0.9 %
10 SYRINGE (ML) INJECTION AS NEEDED
Status: DISCONTINUED | OUTPATIENT
Start: 2023-09-25 | End: 2023-09-26 | Stop reason: HOSPADM

## 2023-09-26 LAB
QT INTERVAL: 414 MS
QTC INTERVAL: 434 MS

## 2023-09-26 NOTE — ED PROVIDER NOTES
EMERGENCY DEPARTMENT ENCOUNTER    Room Number:  09/09  PCP: Aleja Robert MD  History obtained from: Patient, daughter      HPI:  Chief Complaint: Passing out  A complete HPI/ROS/PMH/PSH/SH/FH are unobtainable due to: Not applicable  Context: Denisa Holt is a 58 y.o. female who presents to the ED c/o passing out.  Was eating dinner in a restaurant when she suddenly became lightheaded.  Shortly after lost consciousness.  Was caught by her daughter and did not hit the ground.  Denies any other recent illness, fever, chills.  Did have a similar episode 2 weeks ago where she thought she was going to pass out.  No chest pain or shortness of breath.  No nausea or vomiting.  Currently at baseline.  Reports mild right-sided headache.            PAST MEDICAL HISTORY  Active Ambulatory Problems     Diagnosis Date Noted    Vitamin D deficiency 08/04/2016    Inflammation of sacroiliac joint 08/04/2016    Menopausal symptom 08/04/2016    Hypothyroidism 08/04/2016    Airway hyperreactivity 08/04/2016    Acne 08/04/2016    Osteoporosis 01/08/2013    H/O thyroidectomy 04/18/2017    Insomnia 04/18/2017    Mixed dyslipidemia 05/11/2018     Resolved Ambulatory Problems     Diagnosis Date Noted    Osteopenia 08/04/2016    Hypertension 05/17/2012    Simple goiter 11/30/2012    Thyroid nodule 05/17/2012    Tobacco abuse counseling 04/18/2017    Encounter for screening for malignant neoplasm of colon 11/13/2018     Past Medical History:   Diagnosis Date    Acute sprain of ligament of neck 09/2019    Asthma     BRBPR (bright red blood per rectum) 09/2020    Cervicobrachial syndrome 09/2019    Colon polyps     Costochondritis 12/2016    Depression     IGLESIAS (dyspnea on exertion) 10/2020    Goiter, simple     Hemorrhoids     History of rheumatic fever     Hyperlipidemia     Iron deficiency anemia     Onychomycosis 12/2017    Pain of left calf 09/03/2019    Palpitations 05/2018    PNA (pneumonia) 12/06/2016    RAD (reactive airway disease)      Seasonal allergies     Seborrheic keratosis     Segmental and somatic dysfunction of cervical region 2019    Segmental and somatic dysfunction of lumbar region 2019    Segmental and somatic dysfunction of upper extremity 2019    Thickened endometrium 2019         PAST SURGICAL HISTORY  Past Surgical History:   Procedure Laterality Date    CHOLECYSTECTOMY N/A 1993    COLONOSCOPY N/A 2018    3 MM BENIGN POLYP IN CECUM, HEMORRHOIDS, DR. GIORGI JONES AT [facility]    SKIN BIOPSY Right 2019    RIGHT UPPER INNER ARM, PATH: SEBORRHEIC KERATOSIS, DR. ILAN JOY    THYROIDECTOMY Bilateral 2003         FAMILY HISTORY  Family History   Problem Relation Age of Onset    Hypertension Mother          82    Stroke Father          72    Heart attack Father     Heart disease Father     Cervical cancer Cousin     Cancer Cousin     Breast cancer Neg Hx     Ovarian cancer Neg Hx          SOCIAL HISTORY  Social History     Socioeconomic History    Marital status:    Tobacco Use    Smoking status: Former     Packs/day: 0.25     Years: 10.00     Pack years: 2.50     Types: Cigarettes     Start date:      Quit date:      Years since quittin.7    Smokeless tobacco: Never   Vaping Use    Vaping Use: Never used   Substance and Sexual Activity    Alcohol use: Yes     Comment: wine soicially    Drug use: No    Sexual activity: Yes     Partners: Male     Birth control/protection: Post-menopausal         ALLERGIES  Patient has no known allergies.        REVIEW OF SYSTEMS    As per HPI      PHYSICAL EXAM  ED Triage Vitals   Temp Heart Rate Resp BP SpO2   23   98.1 °F (36.7 °C) 68 18 103/66 98 %      Temp src Heart Rate Source Patient Position BP Location FiO2 (%)   23 --   Tympanic Monitor Lying Left arm        Physical Exam  Constitutional:       General: She is not in  acute distress.  HENT:      Head: Normocephalic and atraumatic.   Cardiovascular:      Rate and Rhythm: Normal rate and regular rhythm.   Pulmonary:      Effort: Pulmonary effort is normal. No respiratory distress.   Abdominal:      General: There is no distension.      Palpations: Abdomen is soft.      Tenderness: There is no abdominal tenderness.   Musculoskeletal:         General: No swelling or deformity.   Skin:     General: Skin is warm and dry.   Neurological:      Mental Status: She is alert. Mental status is at baseline.         Vital signs and nursing notes reviewed.          LAB RESULTS  Recent Results (from the past 24 hour(s))   ECG 12 Lead Syncope    Collection Time: 09/25/23  8:37 PM   Result Value Ref Range    QT Interval 414 ms    QTC Interval 434 ms   Comprehensive Metabolic Panel    Collection Time: 09/25/23  8:52 PM    Specimen: Blood   Result Value Ref Range    Glucose 89 65 - 99 mg/dL    BUN 14 8 - 23 mg/dL    Creatinine 0.64 0.57 - 1.00 mg/dL    Sodium 135 (L) 136 - 145 mmol/L    Potassium 3.3 (L) 3.5 - 5.2 mmol/L    Chloride 102 98 - 107 mmol/L    CO2 22.1 22.0 - 29.0 mmol/L    Calcium 8.8 8.6 - 10.5 mg/dL    Total Protein 6.7 6.0 - 8.5 g/dL    Albumin 4.1 3.5 - 5.2 g/dL    ALT (SGPT) 24 1 - 33 U/L    AST (SGOT) 28 1 - 32 U/L    Alkaline Phosphatase 53 39 - 117 U/L    Total Bilirubin <0.2 0.0 - 1.2 mg/dL    Globulin 2.6 gm/dL    A/G Ratio 1.6 g/dL    BUN/Creatinine Ratio 21.9 7.0 - 25.0    Anion Gap 10.9 5.0 - 15.0 mmol/L    eGFR 101.3 >60.0 mL/min/1.73   Magnesium    Collection Time: 09/25/23  8:52 PM    Specimen: Blood   Result Value Ref Range    Magnesium 1.8 1.6 - 2.4 mg/dL   Single High Sensitivity Troponin T    Collection Time: 09/25/23  8:52 PM    Specimen: Blood   Result Value Ref Range    HS Troponin T 7 <10 ng/L   Green Top (Gel)    Collection Time: 09/25/23  8:52 PM   Result Value Ref Range    Extra Tube Hold for add-ons.    Lavender Top    Collection Time: 09/25/23  8:52 PM    Result Value Ref Range    Extra Tube hold for add-on    Gold Top - SST    Collection Time: 09/25/23  8:52 PM   Result Value Ref Range    Extra Tube Hold for add-ons.    Light Blue Top    Collection Time: 09/25/23  8:52 PM   Result Value Ref Range    Extra Tube Hold for add-ons.    CBC Auto Differential    Collection Time: 09/25/23  8:52 PM    Specimen: Blood   Result Value Ref Range    WBC 9.50 3.40 - 10.80 10*3/mm3    RBC 3.89 3.77 - 5.28 10*6/mm3    Hemoglobin 11.7 (L) 12.0 - 15.9 g/dL    Hematocrit 35.2 34.0 - 46.6 %    MCV 90.5 79.0 - 97.0 fL    MCH 30.1 26.6 - 33.0 pg    MCHC 33.2 31.5 - 35.7 g/dL    RDW 12.0 (L) 12.3 - 15.4 %    RDW-SD 39.5 37.0 - 54.0 fl    MPV 9.8 6.0 - 12.0 fL    Platelets 246 140 - 450 10*3/mm3    Neutrophil % 76.6 (H) 42.7 - 76.0 %    Lymphocyte % 14.0 (L) 19.6 - 45.3 %    Monocyte % 8.0 5.0 - 12.0 %    Eosinophil % 0.7 0.3 - 6.2 %    Basophil % 0.4 0.0 - 1.5 %    Immature Grans % 0.3 0.0 - 0.5 %    Neutrophils, Absolute 7.27 (H) 1.70 - 7.00 10*3/mm3    Lymphocytes, Absolute 1.33 0.70 - 3.10 10*3/mm3    Monocytes, Absolute 0.76 0.10 - 0.90 10*3/mm3    Eosinophils, Absolute 0.07 0.00 - 0.40 10*3/mm3    Basophils, Absolute 0.04 0.00 - 0.20 10*3/mm3    Immature Grans, Absolute 0.03 0.00 - 0.05 10*3/mm3    nRBC 0.0 0.0 - 0.2 /100 WBC       Ordered the above labs and reviewed the results.        RADIOLOGY  CT Head Without Contrast    Result Date: 9/25/2023  Patient: SUNDAR BANG  Time Out: 22:59 Exam(s): CT HEAD Without Contrast EXAM:   CT Head Without Intravenous Contrast CLINICAL HISTORY:    Reason for exam: Syncope presyncope, cerebrovascular cause suspected. TECHNIQUE:   Axial computed tomography images of the head brain without intravenous contrast.  CTDI is 55.92 mGy and DLP is 1007 mGy-cm.  This CT exam was performed according to the principle of ALARA (As Low As Reasonably Achievable) by using one or more of the following dose reduction techniques: automated exposure control,  adjustment of the mA and or kV according to patient size, and or use of iterative reconstruction technique. COMPARISON:   No relevant prior studies available. FINDINGS:   Brain:  No hemorrhage, extra-axial fluid collection, mass effect, or edema.   Ventricles:  Unremarkable.   Bones joints:  Unremarkable. No fracture.   Soft tissues:  Unremarkable.   Sinuses:  No acute sinusitis.   Mastoid air cells:  Unremarkable as visualized. IMPRESSION:       1. No acute intracranial abnormality.     Electronically signed by Jordan Lamas MD on 09-25-23 at 2259     Ordered the above noted radiological studies. Reviewed by me in PACS.                MEDICATIONS GIVEN IN ER  Medications   sodium chloride 0.9 % flush 10 mL (has no administration in time range)               MEDICAL DECISION MAKING, PROGRESS, and CONSULTS    MDM: Patient presented the emergency department status post syncopal episode, otherwise well-appearing, vitals otherwise stable.  Labs and imaging otherwise reassuring, no clear etiology of her symptoms identified.  History not concerning for cardiac syncope.  Discussed with patient and family, will discharge with supportive care and continued outpatient follow-up.  Given return precautions and discharged home.    All labs have been independently reviewed by me.  All radiology studies have been reviewed by me and I have also reviewed the radiology report.   EKG's independently viewed and interpreted by me.  Discussion below represents my analysis of pertinent findings related to patient's condition, differential diagnosis, treatment plan and final disposition.      Additional sources:  - Discussed/ obtained information from independent historians: Discussed details of episode with daughter at bedside    - External (non-ED) record review:     - Chronic or social conditions impacting care:     - Shared decision making: Discussed plan for continued observation and outpatient follow-up, patient agrees.      Orders  placed during this visit:  Orders Placed This Encounter   Procedures    CT Head Without Contrast    [place] Draw    Comprehensive Metabolic Panel    Magnesium    Single High Sensitivity Troponin T    CBC Auto Differential    NPO Diet NPO Type: Strict NPO    Undress & Gown    Continuous Pulse Oximetry    Vital Signs    Orthostatic Blood Pressure    Oxygen Therapy- Nasal Cannula; Titrate 1-6 LPM Per SpO2; 90 - 95%    POC Glucose Once    ECG 12 Lead Syncope    Insert Peripheral IV    CBC & Differential    Green Top (Gel)    Lavender Top    Gold Top - SST    Light Blue Top         Additional orders considered but not ordered:  Considered CT PE however patient has no cardiopulmonary symptoms.        Differential diagnosis includes but is not limited to:    Syncope, electrolyte abnormality, dehydration, vagal episode, intracranial mass lesion      Independent interpretation of labs, radiology studies, and discussions with consultants:  ED Course as of 09/26/23 0015   Mon Sep 25, 2023   2158 CT head interpreted myself:  No hemorrhage or midline shift [FS]   2158 2037, sinus rhythm rate of 66, no acute ST segment changes or T wave inversions. [FS]      ED Course User Index  [FS] Salty Muro MD           DIAGNOSIS  Final diagnoses:   Syncope and collapse         DISPOSITION  Discharged home        Latest Documented Vital Signs:  As of 00:15 EDT  BP- 98/62 HR- 68 Temp- 98.1 °F (36.7 °C) (Tympanic) O2 sat- 98%              --    Please note that portions of this were completed with a voice recognition program.       Note Disclaimer: At [facility], we believe that sharing information builds trust and better relationships. You are receiving this note because you are receiving care at [facility] or recently visited. It is possible you will see health information before a provider has talked with you about it. This kind of information can be easy to misunderstand. To help you fully understand what it means for your  health, we urge you to discuss this note with your provider.             Salty Muro MD  09/26/23 0018

## 2023-09-29 ENCOUNTER — OFFICE VISIT (OUTPATIENT)
Dept: FAMILY MEDICINE CLINIC | Facility: CLINIC | Age: 58
End: 2023-09-29
Payer: COMMERCIAL

## 2023-09-29 VITALS
HEART RATE: 74 BPM | RESPIRATION RATE: 16 BRPM | WEIGHT: 142 LBS | DIASTOLIC BLOOD PRESSURE: 72 MMHG | BODY MASS INDEX: 23.66 KG/M2 | SYSTOLIC BLOOD PRESSURE: 112 MMHG | OXYGEN SATURATION: 97 % | HEIGHT: 65 IN

## 2023-09-29 DIAGNOSIS — E89.0 POSTOPERATIVE HYPOTHYROIDISM: ICD-10-CM

## 2023-09-29 DIAGNOSIS — R19.7 DIARRHEA, UNSPECIFIED TYPE: ICD-10-CM

## 2023-09-29 DIAGNOSIS — E87.6 HYPOKALEMIA: Primary | ICD-10-CM

## 2023-09-29 PROCEDURE — 99213 OFFICE O/P EST LOW 20 MIN: CPT | Performed by: FAMILY MEDICINE

## 2023-09-29 RX ORDER — ALENDRONATE SODIUM 70 MG/1
TABLET ORAL
COMMUNITY
Start: 2023-08-28

## 2023-09-29 RX ORDER — ATORVASTATIN CALCIUM 20 MG/1
20 TABLET, FILM COATED ORAL DAILY
Qty: 30 TABLET | Refills: 5 | COMMUNITY
Start: 2023-04-19 | End: 2023-10-16

## 2023-09-29 RX ORDER — MONTELUKAST SODIUM 10 MG/1
TABLET ORAL
COMMUNITY
Start: 2023-07-11

## 2023-09-29 RX ORDER — LEVOTHYROXINE SODIUM 175 UG/1
TABLET ORAL
COMMUNITY
Start: 2023-08-28 | End: 2023-10-02

## 2023-09-29 NOTE — PROGRESS NOTES
Assessment and Plan     Problem List Items Addressed This Visit          Endocrine and Metabolic    Hypothyroidism    Overview     Caroline 9/29/2023  Last labs done in April were overtreated. She has not had any since.          Relevant Medications    levothyroxine (SYNTHROID, LEVOTHROID) 175 MCG tablet    Other Relevant Orders    T4    T4, free    TSH     Other Visit Diagnoses       Hypokalemia    -  Primary    Relevant Orders    Comprehensive Metabolic Panel    Diarrhea, unspecified type        use probiotics and imodium prn    Relevant Orders    CBC & Differential          Return if symptoms worsen or fail to improve.    Patient was given instructions and counseling regarding her condition or for health maintenance advice. Please see specific information pulled into the AVS if appropriate.        Denisa is a 58 y.o. being seen today for  er  follow up   Subjective   History of the Present Illness   She passed out in a restaurant Monday.  A couple of months previously she got very dizzy but didn't pass out with it. She is drinking fluids but doesn't feel like she gets enough fluid to take care of her dehydration. She is having diarrhea that started on the day she went to the ER which was four days ago. She is using an electrolyte solution. She feels like she is dehydrated all the time. Has taken three electrolyte solution powders this morning. If she doesn't take them she feels drained since she was sick. Did have fever Tuesday night and cramping. No nausea. Was in [place] in August. In ER had mild hypokalemia and hyponatremia and a bit of a left shift to her labs.   Social History  She  reports that she quit smoking about 8 years ago. Her smoking use included cigarettes. She started smoking about 35 years ago. She has a 2.50 pack-year smoking history. She has never used smokeless tobacco. She reports current alcohol use. She reports that she does not use drugs.  Objective   Vital Signs        BP Readings from Last  1 Encounters:   09/29/23 112/72     Wt Readings from Last 3 Encounters:   09/29/23 64.4 kg (142 lb)   09/25/23 63.5 kg (140 lb)   10/28/22 68.9 kg (152 lb)   Body mass index is 23.63 kg/m².     Physical Exam  Vitals reviewed.   Constitutional:       General: She is not in acute distress.     Appearance: She is well-developed.   Abdominal:      General: Bowel sounds are normal. There is no distension.      Palpations: Abdomen is soft. There is no mass.      Tenderness: There is no abdominal tenderness. There is no guarding or rebound.   Psychiatric:         Behavior: Behavior normal.         Thought Content: Thought content normal.         Judgment: Judgment normal.

## 2023-09-30 LAB
ALBUMIN SERPL-MCNC: 4.3 G/DL (ref 3.8–4.9)
ALBUMIN/GLOB SERPL: 1.8 {RATIO} (ref 1.2–2.2)
ALP SERPL-CCNC: 57 IU/L (ref 44–121)
ALT SERPL-CCNC: 25 IU/L (ref 0–32)
AST SERPL-CCNC: 26 IU/L (ref 0–40)
BASOPHILS # BLD AUTO: 0 X10E3/UL (ref 0–0.2)
BASOPHILS NFR BLD AUTO: 1 %
BILIRUB SERPL-MCNC: <0.2 MG/DL (ref 0–1.2)
BUN SERPL-MCNC: 21 MG/DL (ref 8–27)
BUN/CREAT SERPL: 37 (ref 12–28)
CALCIUM SERPL-MCNC: 9.5 MG/DL (ref 8.7–10.3)
CHLORIDE SERPL-SCNC: 101 MMOL/L (ref 96–106)
CO2 SERPL-SCNC: 22 MMOL/L (ref 20–29)
CREAT SERPL-MCNC: 0.57 MG/DL (ref 0.57–1)
EGFRCR SERPLBLD CKD-EPI 2021: 104 ML/MIN/1.73
EOSINOPHIL # BLD AUTO: 0.1 X10E3/UL (ref 0–0.4)
EOSINOPHIL NFR BLD AUTO: 2 %
ERYTHROCYTE [DISTWIDTH] IN BLOOD BY AUTOMATED COUNT: 12.2 % (ref 11.7–15.4)
GLOBULIN SER CALC-MCNC: 2.4 G/DL (ref 1.5–4.5)
GLUCOSE SERPL-MCNC: 81 MG/DL (ref 70–99)
HCT VFR BLD AUTO: 38.4 % (ref 34–46.6)
HGB BLD-MCNC: 12.8 G/DL (ref 11.1–15.9)
IMM GRANULOCYTES # BLD AUTO: 0 X10E3/UL (ref 0–0.1)
IMM GRANULOCYTES NFR BLD AUTO: 0 %
LYMPHOCYTES # BLD AUTO: 1.5 X10E3/UL (ref 0.7–3.1)
LYMPHOCYTES NFR BLD AUTO: 26 %
MCH RBC QN AUTO: 29.8 PG (ref 26.6–33)
MCHC RBC AUTO-ENTMCNC: 33.3 G/DL (ref 31.5–35.7)
MCV RBC AUTO: 90 FL (ref 79–97)
MONOCYTES # BLD AUTO: 0.6 X10E3/UL (ref 0.1–0.9)
MONOCYTES NFR BLD AUTO: 10 %
NEUTROPHILS # BLD AUTO: 3.6 X10E3/UL (ref 1.4–7)
NEUTROPHILS NFR BLD AUTO: 61 %
PLATELET # BLD AUTO: 261 X10E3/UL (ref 150–450)
POTASSIUM SERPL-SCNC: 4.1 MMOL/L (ref 3.5–5.2)
PROT SERPL-MCNC: 6.7 G/DL (ref 6–8.5)
RBC # BLD AUTO: 4.29 X10E6/UL (ref 3.77–5.28)
SODIUM SERPL-SCNC: 139 MMOL/L (ref 134–144)
T4 FREE SERPL-MCNC: 1.81 NG/DL (ref 0.82–1.77)
T4 SERPL-MCNC: 12.2 UG/DL (ref 4.5–12)
TSH SERPL DL<=0.005 MIU/L-ACNC: 0.96 UIU/ML (ref 0.45–4.5)
WBC # BLD AUTO: 5.8 X10E3/UL (ref 3.4–10.8)

## 2023-10-02 RX ORDER — LEVOTHYROXINE SODIUM 0.12 MG/1
125 TABLET ORAL DAILY
Qty: 90 TABLET | Refills: 0 | Status: SHIPPED | OUTPATIENT
Start: 2023-10-02

## 2024-03-22 ENCOUNTER — OFFICE VISIT (OUTPATIENT)
Dept: FAMILY MEDICINE CLINIC | Facility: CLINIC | Age: 59
End: 2024-03-22
Payer: COMMERCIAL

## 2024-03-22 VITALS
RESPIRATION RATE: 16 BRPM | SYSTOLIC BLOOD PRESSURE: 100 MMHG | HEART RATE: 76 BPM | HEIGHT: 65 IN | DIASTOLIC BLOOD PRESSURE: 60 MMHG | OXYGEN SATURATION: 97 % | BODY MASS INDEX: 24.99 KG/M2 | WEIGHT: 150 LBS

## 2024-03-22 DIAGNOSIS — Z00.00 HEALTHCARE MAINTENANCE: Primary | ICD-10-CM

## 2024-03-22 DIAGNOSIS — Z12.11 ENCOUNTER FOR SCREENING FOR MALIGNANT NEOPLASM OF COLON: ICD-10-CM

## 2024-03-22 DIAGNOSIS — Z12.31 ENCOUNTER FOR SCREENING MAMMOGRAM FOR BREAST CANCER: ICD-10-CM

## 2024-03-22 DIAGNOSIS — Z87.891 PERSONAL HISTORY OF TOBACCO USE, PRESENTING HAZARDS TO HEALTH: ICD-10-CM

## 2024-03-22 DIAGNOSIS — J45.20 MILD INTERMITTENT ASTHMA WITHOUT COMPLICATION: ICD-10-CM

## 2024-03-22 RX ORDER — FLUTICASONE PROPIONATE AND SALMETEROL 500; 50 UG/1; UG/1
1 POWDER RESPIRATORY (INHALATION)
Qty: 180 EACH | Refills: 3 | Status: SHIPPED | OUTPATIENT
Start: 2024-03-22

## 2024-03-22 NOTE — PROGRESS NOTES
Assessment & Plan  Healthcare maintenance    Mild intermittent asthma without complication  Asthma is newly identified.  The patient is experiencing monthly daytime asthma symptoms and she is experiencing no nighttime asthma symptoms.    Plan: Continue same medication/s without change.    Refilled meds    Patient Treatment Goals:  new med sent .    Followup in 1 year.          Personal history of tobacco use, presenting hazards to health            No follow-ups on file.  Patient was given instructions and counseling regarding her condition or for health maintenance advice. Please see specific information pulled into the AVS if appropriate.          Denisa is a 58 y.o. being seen today for  Annual Exam   HISTORY    HPI Annual Exam-Postmenopausal:     Denisa Holt 58 y.o.female presents for annual exam.     Last pap : approximate date 3/2022 and was normal  Last mammogramapproximate date 2021 and was normal  The patient is not taking hormone replacement therapy.   The patient wears seatbelts: yes.    The patient has regular exercise: yes.     Exercise: 5 times/week.  This patient has ever been tested for HepC : yes .   Dental Exam. up to date  history; colonoscopy/sigmoidoscopy: FIT done some years ago  Her immunization are up-to-date.  She is eating a healthy diet.   Labs results were ordered  Social History  She  reports that she quit smoking about 9 years ago. Her smoking use included cigarettes. She started smoking about 36 years ago. She has a 6.8 pack-year smoking history. She has never used smokeless tobacco. She reports current alcohol use. She reports that she does not use drugs.  EXAM DATA    Vital Signs        BP Readings from Last 1 Encounters:   03/22/24 100/60     Wt Readings from Last 3 Encounters:   03/22/24 68 kg (150 lb)   09/29/23 64.4 kg (142 lb)   09/25/23 63.5 kg (140 lb)   Body mass index is 24.96 kg/m².  Physical Exam  Vitals reviewed.   Constitutional:       General: She is not in acute  distress.     Appearance: She is well-developed.   HENT:      Head: Normocephalic and atraumatic.      Right Ear: Tympanic membrane, ear canal and external ear normal.      Left Ear: Tympanic membrane, ear canal and external ear normal.   Eyes:      Conjunctiva/sclera: Conjunctivae normal.   Neck:      Thyroid: No thyromegaly.      Trachea: No tracheal deviation.   Cardiovascular:      Rate and Rhythm: Normal rate and regular rhythm.      Heart sounds: Normal heart sounds.   Pulmonary:      Effort: Pulmonary effort is normal. No respiratory distress.      Breath sounds: Normal breath sounds. No wheezing or rales.   Musculoskeletal:         General: No deformity.      Cervical back: Normal range of motion and neck supple.   Lymphadenopathy:      Cervical: No cervical adenopathy.   Skin:     General: Skin is warm and dry.   Neurological:      Mental Status: She is alert and oriented to person, place, and time.   Psychiatric:         Behavior: Behavior normal.         Thought Content: Thought content normal.         Judgment: Judgment normal.       BMI is within normal parameters. No other follow-up for BMI required.       _________________________________________________________________       Low-Dose Lung Cancer CT Screening Visit    CHIEF COMPLAINT:    Shared Decision Making  I am discussing tobacco cessation today with Denisa Holt    SMOKING HISTORY:     Social History     Tobacco Use   Smoking Status Former    Current packs/day: 0.00    Average packs/day: 0.7 packs/day for 31.4 years (22.8 ttl pk-yrs)    Types: Cigarettes    Start date: 8/15/1983    Quit date: 2015    Years since quittin.2   Smokeless Tobacco Never   Tobacco Comments    Like to do a lung cancer screening test       SUBJECTIVE:     Denisa Holt is a former smoker quitting 9 years ago with a  22  pack year history.  She reports no use of alternate forms of tobacco, electronic cigarettes, marijuana or other substances.  Based on the  "recommendation of the [place] Preventive Services Task Force, this patient is at high risk for lung cancer and a low-dose CT screening scan is recommended.     The patient has had no hemoptysis, unintentional weight loss or increasing shortness of breath. The patient is asymptomatic and has no signs or symptoms of lung cancer.     Together we discussed the potential benefits and potential harms of being screened for lung cancer including the potential for follow up diagnostic testing, risk for over diagnosis, false positive rate and radiation exposure using the [facility] Lung Cancer Screening Shared Decision-Making Tool. A copy of this decision aid resource has been provided in the after visit summary.  We also reviewed the patient's smoking history and counseled her on the importance and health benefits of maintaining cigarette smoking abstinence.      OBJECTIVE:    /60   Pulse 76   Resp 16   Ht 165.1 cm (65\")   Wt 68 kg (150 lb)   LMP 05/04/2015 Comment: 51  SpO2 97%   Breastfeeding No   BMI 24.96 kg/m²   General: no distress, alert and oriented  Chest: Lung sounds are clear to auscultation, no wheezes, rales or rhonchi, with symmetric air entry. No respiratory distress  Cardiovascular: RRR with no murmur auscultated      Continued Smoking Abstinence discussion:     We discussed that there are a number of resources and interventions to assist with smoking cessation if needed in the future.   On a scale of zero to ten, the patient rates their motivation to stay quit at a 10 out of 10 today.  The patient is confident that they will never smoke in the future.    Recommendations for continued lung cancer screening:      We discussed the NCCN guidelines for lung cancer screening and the patient verbalized understanding that annual screening is recommended until fifteen years beyond smoking as long as they have no other disease or comorbidity that would prevent them from receiving cancer " treatments such as surgery should a lung cancer be detected.  After review of the NCCN guidelines and recommendations for ongoing screening, the patient verbalized understanding of recommendations for follow-up.  The patient has decided to proceed with a Low Dose Lung Cancer Screening CT today.       3minutes face-to-face spent counseling patient on the continued health benefits of having quit tobacco, maintaining smoking abstinence, smoking cessation strategies and resources, as well as the importance of adherence to annual lung cancer low-dose CT screening.

## 2024-03-22 NOTE — ASSESSMENT & PLAN NOTE
Asthma is newly identified.  The patient is experiencing monthly daytime asthma symptoms and she is experiencing no nighttime asthma symptoms.    Plan: Continue same medication/s without change.    Refilled meds    Patient Treatment Goals: new med sent.    Followup in 1 year.

## 2024-03-23 LAB
ALBUMIN SERPL-MCNC: 4.4 G/DL (ref 3.8–4.9)
ALBUMIN/GLOB SERPL: 1.8 {RATIO} (ref 1.2–2.2)
ALP SERPL-CCNC: 45 IU/L (ref 44–121)
ALT SERPL-CCNC: 25 IU/L (ref 0–32)
AST SERPL-CCNC: 32 IU/L (ref 0–40)
BILIRUB SERPL-MCNC: <0.2 MG/DL (ref 0–1.2)
BUN SERPL-MCNC: 23 MG/DL (ref 8–27)
BUN/CREAT SERPL: 39 (ref 12–28)
CALCIUM SERPL-MCNC: 9.8 MG/DL (ref 8.7–10.3)
CHLORIDE SERPL-SCNC: 100 MMOL/L (ref 96–106)
CHOLEST SERPL-MCNC: 153 MG/DL (ref 100–199)
CO2 SERPL-SCNC: 23 MMOL/L (ref 20–29)
CREAT SERPL-MCNC: 0.59 MG/DL (ref 0.57–1)
EGFRCR SERPLBLD CKD-EPI 2021: 103 ML/MIN/1.73
ERYTHROCYTE [DISTWIDTH] IN BLOOD BY AUTOMATED COUNT: 12.2 % (ref 11.7–15.4)
GLOBULIN SER CALC-MCNC: 2.4 G/DL (ref 1.5–4.5)
GLUCOSE SERPL-MCNC: 80 MG/DL (ref 70–99)
HCT VFR BLD AUTO: 38.1 % (ref 34–46.6)
HDLC SERPL-MCNC: 55 MG/DL
HGB BLD-MCNC: 12.5 G/DL (ref 11.1–15.9)
LDLC SERPL CALC-MCNC: 74 MG/DL (ref 0–99)
LDLC/HDLC SERPL: 1.3 RATIO (ref 0–3.2)
MCH RBC QN AUTO: 29.5 PG (ref 26.6–33)
MCHC RBC AUTO-ENTMCNC: 32.8 G/DL (ref 31.5–35.7)
MCV RBC AUTO: 90 FL (ref 79–97)
PLATELET # BLD AUTO: 353 X10E3/UL (ref 150–450)
POTASSIUM SERPL-SCNC: 3.9 MMOL/L (ref 3.5–5.2)
PROT SERPL-MCNC: 6.8 G/DL (ref 6–8.5)
RBC # BLD AUTO: 4.24 X10E6/UL (ref 3.77–5.28)
SODIUM SERPL-SCNC: 139 MMOL/L (ref 134–144)
TRIGL SERPL-MCNC: 139 MG/DL (ref 0–149)
TSH SERPL DL<=0.005 MIU/L-ACNC: 1.87 UIU/ML (ref 0.45–4.5)
VLDLC SERPL CALC-MCNC: 24 MG/DL (ref 5–40)
WBC # BLD AUTO: 6.9 X10E3/UL (ref 3.4–10.8)

## 2024-04-08 ENCOUNTER — TELEPHONE (OUTPATIENT)
Dept: FAMILY MEDICINE CLINIC | Facility: CLINIC | Age: 59
End: 2024-04-08

## 2024-04-08 DIAGNOSIS — Z12.11 COLON CANCER SCREENING: Primary | ICD-10-CM

## 2024-04-24 ENCOUNTER — HOSPITAL ENCOUNTER (OUTPATIENT)
Dept: CT IMAGING | Facility: HOSPITAL | Age: 59
Discharge: HOME | End: 2024-04-24
Payer: COMMERCIAL

## 2024-04-24 ENCOUNTER — HOSPITAL ENCOUNTER (OUTPATIENT)
Dept: MAMMOGRAPHY | Facility: HOSPITAL | Age: 59
Discharge: HOME | End: 2024-04-24
Payer: COMMERCIAL

## 2024-04-24 DIAGNOSIS — Z12.31 ENCOUNTER FOR SCREENING MAMMOGRAM FOR BREAST CANCER: ICD-10-CM

## 2024-04-24 DIAGNOSIS — Z87.891 PERSONAL HISTORY OF TOBACCO USE, PRESENTING HAZARDS TO HEALTH: ICD-10-CM

## 2024-04-24 PROCEDURE — 77063 BREAST TOMOSYNTHESIS BI: CPT

## 2024-04-24 PROCEDURE — 77067 SCR MAMMO BI INCL CAD: CPT

## 2024-04-24 PROCEDURE — 71271 CT THORAX LUNG CANCER SCR C-: CPT

## 2024-04-29 DIAGNOSIS — J45.20 MILD INTERMITTENT REACTIVE AIRWAY DISEASE: ICD-10-CM

## 2024-04-29 RX ORDER — PRAVASTATIN SODIUM 40 MG
40 TABLET ORAL NIGHTLY
Qty: 90 TABLET | Refills: 2 | Status: SHIPPED | OUTPATIENT
Start: 2024-04-29

## 2024-04-29 RX ORDER — ALBUTEROL SULFATE 90 UG/1
2 AEROSOL, METERED RESPIRATORY (INHALATION) EVERY 4 HOURS PRN
Qty: 8.5 G | Refills: 2 | Status: SHIPPED | OUTPATIENT
Start: 2024-04-29

## 2024-04-29 RX ORDER — MONTELUKAST SODIUM 10 MG/1
10 TABLET ORAL NIGHTLY
Qty: 30 TABLET | Refills: 11 | Status: SHIPPED | OUTPATIENT
Start: 2024-04-29 | End: 2025-04-29

## 2024-04-29 NOTE — TELEPHONE ENCOUNTER
Caller: Denisa Holt    Relationship: Self    Best call back number: [phone]     Requested Prescriptions:   Requested Prescriptions     Pending Prescriptions Disp Refills    albuterol sulfate  (90 Base) MCG/ACT inhaler 8.5 g 2     Sig: Inhale 2 puffs Every 4 (Four) Hours As Needed for Wheezing.    montelukast (SINGULAIR) 10 MG tablet      pravastatin (PRAVACHOL) 40 MG tablet 90 tablet         Pharmacy where request should be sent: [facility] [id number] [place] [id number] [facility] [phone] [facility] [phone] FX     Last office visit with prescribing clinician: 3/22/2024   Last telemedicine visit with prescribing clinician: Visit date not found   Next office visit with prescribing clinician: Visit date not found     Additional details provided by patient: PATIENT WOULD LIKE TO GET A 3 MONTH SUPPLY SHE WILL BE TRAVELING OVERSEAS     Does the patient have less than a 3 day supply:  [x] Yes  [] No    Would you like a call back once the refill request has been completed: [] Yes [x] No    If the office needs to give you a call back, can they leave a voicemail: [] Yes [x] No    Sheryl Villeda Rep   04/29/24 13:02 EDT

## 2024-04-30 ENCOUNTER — TELEPHONE (OUTPATIENT)
Dept: SURGERY | Facility: CLINIC | Age: 59
End: 2024-04-30
Payer: COMMERCIAL

## 2024-04-30 NOTE — TELEPHONE ENCOUNTER
Message was left on patients voicemail to call Sarahi at the office.  I was calling to schedule a colonoscopy.  Patient was given my private office number to call

## 2024-05-06 ENCOUNTER — PREP FOR SURGERY (OUTPATIENT)
Dept: OTHER | Facility: HOSPITAL | Age: 59
End: 2024-05-06
Payer: COMMERCIAL

## 2024-05-06 DIAGNOSIS — Z86.010 HISTORY OF COLON POLYPS: Primary | ICD-10-CM

## 2024-05-06 PROBLEM — Z86.0100 HISTORY OF COLON POLYPS: Status: ACTIVE | Noted: 2024-05-06

## 2024-08-21 ENCOUNTER — TELEPHONE (OUTPATIENT)
Dept: FAMILY MEDICINE CLINIC | Facility: CLINIC | Age: 59
End: 2024-08-21

## 2024-08-21 DIAGNOSIS — R53.83 OTHER FATIGUE: Primary | ICD-10-CM

## 2024-08-23 ENCOUNTER — OFFICE VISIT (OUTPATIENT)
Dept: SLEEP MEDICINE | Facility: HOSPITAL | Age: 59
End: 2024-08-23
Payer: COMMERCIAL

## 2024-08-23 ENCOUNTER — OFFICE VISIT (OUTPATIENT)
Dept: SURGERY | Facility: CLINIC | Age: 59
End: 2024-08-23
Payer: COMMERCIAL

## 2024-08-23 VITALS — OXYGEN SATURATION: 96 % | WEIGHT: 157 LBS | BODY MASS INDEX: 26.16 KG/M2 | HEIGHT: 65 IN | HEART RATE: 82 BPM

## 2024-08-23 VITALS
DIASTOLIC BLOOD PRESSURE: 80 MMHG | WEIGHT: 161.1 LBS | HEIGHT: 65 IN | HEART RATE: 71 BPM | SYSTOLIC BLOOD PRESSURE: 108 MMHG | BODY MASS INDEX: 26.84 KG/M2 | OXYGEN SATURATION: 95 %

## 2024-08-23 DIAGNOSIS — R53.83 OTHER FATIGUE: ICD-10-CM

## 2024-08-23 DIAGNOSIS — K62.5 RECTAL BLEEDING: Primary | ICD-10-CM

## 2024-08-23 DIAGNOSIS — R06.81 WITNESSED EPISODE OF APNEA: Primary | ICD-10-CM

## 2024-08-23 DIAGNOSIS — I47.19 NARROW COMPLEX TACHYCARDIA: ICD-10-CM

## 2024-08-23 DIAGNOSIS — R51.9 MORNING HEADACHE: ICD-10-CM

## 2024-08-23 DIAGNOSIS — R06.83 SNORING: ICD-10-CM

## 2024-08-23 DIAGNOSIS — K64.8 INTERNAL HEMORRHOIDS WITH COMPLICATION: ICD-10-CM

## 2024-08-23 PROBLEM — R42 DIZZINESS: Status: ACTIVE | Noted: 2024-08-19

## 2024-08-23 PROCEDURE — 99204 OFFICE O/P NEW MOD 45 MIN: CPT | Performed by: NURSE PRACTITIONER

## 2024-08-23 PROCEDURE — G0463 HOSPITAL OUTPT CLINIC VISIT: HCPCS

## 2024-08-23 RX ORDER — HYDROCORTISONE 25 MG/G
CREAM TOPICAL
Qty: 30 G | Refills: 1 | Status: SHIPPED | OUTPATIENT
Start: 2024-08-23

## 2024-08-23 RX ORDER — LEVOTHYROXINE SODIUM 150 UG/1
150 TABLET ORAL
COMMUNITY
Start: 2024-08-12

## 2024-08-23 RX ORDER — APIXABAN 5 MG/1
5 TABLET, FILM COATED ORAL EVERY 12 HOURS SCHEDULED
COMMUNITY
Start: 2024-08-21

## 2024-08-23 RX ORDER — BUDESONIDE AND FORMOTEROL FUMARATE DIHYDRATE 160; 4.5 UG/1; UG/1
2 AEROSOL RESPIRATORY (INHALATION)
COMMUNITY

## 2024-08-23 RX ORDER — ATORVASTATIN CALCIUM 40 MG/1
40 TABLET, FILM COATED ORAL NIGHTLY
COMMUNITY
Start: 2024-08-21

## 2024-08-23 NOTE — PROGRESS NOTES
"  [facility]  [address]  [address]  [place] [address]  Phone   Fax       Denisa Holt  [id number]   1965  59 y.o.  female      Referring Provider and PCP: Aleja Robert MD    Type of service: Initial Sleep Medicine Consult  Date of service: 8/23/2024          CHIEF COMPLAINT: Snoring, witnessed apnea       HISTORY OF PRESENT ILLNESS:  Denisa Holt 59 y.o. was seen today on 8/23/2024 at [facility].    Patient has a history of asthma, stroke*, suspected atrial fibrillation, PFO, for which the patient follows with outside providers.  Patient has no history of tonsillectomy.  Had rhinoplasty around 2004. Patient presents today with symptoms of snoring and witnessed apnea.  Her daughter who is an ER physician in [place] is with her in the office today, per patient preference.  Patient was hospitalized with stroke* and suspected atrial fibrillation and had echocardiogram showing PFO, seen by Dr. Bernabe, discharged on Eliquis.  She is going to be wearing a cardiac event monitor to look further into suspected A-fib.  Patient gets about 8 to 9 hours of sleep per night using over-the-counter sleep aid that contains melatonin.  Has been told she has apneic episodes while sleeping.  Also with history of morning headaches.  Uses the restroom about twice per night.    ADDENDUM*: Primary care provider requested clarification to note, in particular in relation to whether patient has had a prior stroke.  Clarification to the above: patient was RULED OUT for having acute stroke during 8/2024 hospitalization.  Was seen by cardiology for questionable atrial fibrillation (\"narrow complex tachycardia\" seen on apple watch, hard to differentiate based on strip), to have monitor study through cardiology.  Was told had possible PFO (8/20/24 echo: \"positive bubble study for right to left atrial level shunting\").  Also was found to have possible \"chronic " "subcortical infarct\", suspected small vessel related, noted in 8/20/24 Neurology note from Dr. Tomasz Cornejo, DO, but felt to unlikely explain dizziness episode per neuro note.  Under the \"review of pertinent diagnostic data\" in the above referenced neurology note, it was noted that CT head had \"chronic appearing subcortical infarcts.\" The MRI brain was noted not to show acute infarcts but \"has subcortical chronic infarcts R frontal\" area. There was also notation of \"chronic infarct vs perivascular space\" alterations noted in the right temporal/occipital regions close to the ventricle however neurologist also noted prior CT head 9/2023 done through outside facility showing periventricular hypodensities and felt unclear whether this was old stroke versus just an enlarged perivascular space.  Please see 8/20/24 and 8/21/24 neurology notes from Robert.                 SLEEP HISTORY:  Sleep schedule:  Bedtime: 10:30 PM  Wake time: 7:30 AM  Time it takes to fall asleep: 20 minutes  Average hours of sleep: 8-9  Number of naps per day: 0    Symptoms:   In addition to the above, patient reports the following associated symptoms:  Have you ever awakened gasping for breath, coughing, choking: No   Change in weight:  Yes, gained 10 pounds  Morning headaches:  Yes   Awaken with a sore throat or dry mouth:  No   Leg jerking at night:  No   Creepy crawly feeling in legs/urge to move legs: No   Teeth grinding: Yes   Have you ever awakened at night with a sour taste or burning sensation in your chest:  No   Do you have muscle weakness with laughing or anger:  No   Have you ever felt paralyzed while going to sleep or waking up:  No   Sleepwalking: No   Nightmares: No   Nocturia (urination at night): 2 times per night  Memory Problem: No     Medical Conditions (PMH):   Asthma  \"Chronic subcortical infarct\" noted during 2024 hospitalization, ruled out for acute stroke at that time, \"unclear if true TIA\" per 8/20/24 neuro note,  " "Handshoe  Suspected atrial fibrillation    Social history:  Do you drive a commercial vehicle:  No   Shift work:  No   Tobacco use: Former, ages [age]  Alcohol use: 0 per week  Caffeinated drinks: 1-2 per day  Occupation:     Family History (parents and siblings) (pertaining to sleep medicine):  No relevant    Medications: reviewed    Allergies:  Patient has no known allergies.      REVIEW OF SYSTEMS:  Pertinent positive symptoms are:  Snoring  Witnessed apnea  [place] Sleepiness Scale of Total score: 2   Nasal congestion        PHYSICAL EXAM:  CONSTITUTINONAL:   Vitals:    08/23/24 1300   Pulse: 82   SpO2: 96%   Weight: 71.2 kg (157 lb)   Height: 165.1 cm (65\")    Body mass index is 26.13 kg/m².   HEAD: atraumatic, normocephalic   THROAT: tonsils are not significant, Mallampati class III  NECK:  , trachea is midline  RESPIRATORY SYSTEM: Respirations even, unlabored, normal rate  CARDIOVASULAR SYSTEM: Normal rate, no edema   NEUROLOGICAL SYSTEM: Alert and oriented x 3  PSYCHIATRIC SYSTEM: Mood is normal/ appropriate     Office note(s) from care team reviewed. Office note(s) reviewed: 8/20/2024 cardiology note    Labs/ Test Results Reviewed:  TSH          9/29/2023    12:00 3/22/2024    14:27   TSH   TSH 0.963  1.870       Most Recent A1C          8/20/2024    05:05   HGBA1C Most Recent   Hemoglobin A1C 5.5          Details          This result is from an external source.                      ASSESSMENT AND PLAN:   Witnessed apnea: patient's symptoms and physical examination are concerning for possible sleep apnea.   I discussed the signs, symptoms, and pathophysiology of sleep apnea with this patient.  I also discussed the possible complications of untreated sleep apnea including but not limited to potential risk of resistant hypertension, insulin resistance, pulmonary hypertension, atrial fibrillation or other arrhythmias, heart attack, stroke, nonrestorative sleep with hypersomnia which can " increase risk for motor vehicle accidents, etc.   Different testing methods including home-based and lab based sleep studies were discussed with this patient.   Based on patient history and physical examination, will proceed with in-lab polysomnogram.  The order for the sleep study is placed in [facility].  The test will be scheduled after prior authorization has been obtained through patient's insurance.  Discussed overview of treatment options for sleep apnea in the office today including PAP therapy and oral mandibular advancement device, and treatment/ management will be discussed in more detail with this patient after the test is completed.  All questions were answered to patient's satisfaction.   Snoring: snoring is the sound created by turbulent airflow vibrating upper airway soft tissue.  I have also discussed factors affecting snoring including sleep deprivation, sleeping on the back and alcohol ingestion. To minimize snoring, patient is advised to have adequate sleep, sleep on their side, and avoid alcohol and sedative medications around bedtime. Do not drive, operate heavy machinery, or do activities that require high concentration if feeling tired/drowsy.  Mildly elevated BMI: Body mass index is 26.13 kg/m².. Patients who are overweight or obese are at increased risk of sleep apnea/ sleep disordered breathing. Weight reduction and healthy lifestyle are encouraged in overweight/ obese patients as part of a comprehensive approach to sleep apnea treatment.      Patient will follow-up after study, 31 to 90 days after PAP therapy initiated if applicable, or contact the office sooner for questions or concerns. Patient's questions were answered.            Thank you again for asking me to consult on this patient.  Please do not hesitate to call me if you have additional questions or concerns.       Janee Johnston DNP, APRN  [facility]
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
## 2024-08-27 ENCOUNTER — TELEPHONE (OUTPATIENT)
Dept: FAMILY MEDICINE CLINIC | Facility: CLINIC | Age: 59
End: 2024-08-27
Payer: COMMERCIAL

## 2024-08-27 NOTE — TELEPHONE ENCOUNTER
Patient is wanting her sleep study report as soon as possible. She stated that she is concerned that she has had a stroke and is at risk for having another one. She has been prescribed Eliquis.

## 2024-08-27 NOTE — TELEPHONE ENCOUNTER
Patient called this afternoon.  Stated she though Kim was going to call her before she left today.  She is not needing the report from sleep study.  She has not had a sleep study.  She is saying there is an issue with getting insurance to approve.  Also mentioned that she had a stroke and that the report was sent to Dr. Robert. (Cardiology?) Patient stated Dr. Robert said she did not have a stroke and that was going to be a problem with getting the sleep study approved.  Patient was angry and stated we did not care and she would find another PCP

## 2024-09-16 ENCOUNTER — TELEPHONE (OUTPATIENT)
Dept: SURGERY | Facility: CLINIC | Age: 59
End: 2024-09-16

## 2024-09-16 NOTE — TELEPHONE ENCOUNTER
DELETE AFTER REVIEWING: Send this encounter to the appropriate pool. See your Call Action Grid or Workflows for direction.    Caller: Denisa Holt    Relationship: Self    Best call back number: [phone]    What orders are you requesting (i.e. lab or imaging): LAB WORK FOR COLONOSCOPY    l notes: PATIENT STATES WOULD LIKE TO HAVE LABWORK FOR COLONOSCOPY DONE WEEK PRIOR TO COLONOSCOPY

## 2024-09-17 NOTE — TELEPHONE ENCOUNTER
CALLED PATIENT AND TOLD HER IT IS AN H & H AND THE ORDER IS IN [facility].  SHE WILL GO TO [facility] OUT PATIENT LAB.

## 2024-09-29 ENCOUNTER — HOSPITAL ENCOUNTER (OUTPATIENT)
Dept: SLEEP MEDICINE | Facility: HOSPITAL | Age: 59
Discharge: HOME | End: 2024-09-29
Admitting: NURSE PRACTITIONER
Payer: COMMERCIAL

## 2024-09-29 DIAGNOSIS — R51.9 MORNING HEADACHE: ICD-10-CM

## 2024-09-29 DIAGNOSIS — R53.83 OTHER FATIGUE: ICD-10-CM

## 2024-09-29 DIAGNOSIS — R06.81 WITNESSED EPISODE OF APNEA: ICD-10-CM

## 2024-09-29 DIAGNOSIS — R06.83 SNORING: ICD-10-CM

## 2024-09-29 DIAGNOSIS — I47.19 NARROW COMPLEX TACHYCARDIA: ICD-10-CM

## 2024-09-29 PROCEDURE — 95810 POLYSOM 6/> YRS 4/> PARAM: CPT

## 2024-10-03 PROCEDURE — 95811 POLYSOM 6/>YRS CPAP 4/> PARM: CPT | Performed by: NURSE PRACTITIONER

## 2024-10-24 NOTE — PROGRESS NOTES
[facility]  [address]  [address]  [place] [address]  Phone   Fax         SLEEP CLINIC FOLLOW-UP PROGRESS NOTE    Denisa Holt  [id number]   1965  59 y.o.  female      PCP: Cecilia Jones DO    DATE OF VISIT: 10/25/2024          CHIEF COMPLAINT: Snoring    HPI:  This is a 59 y.o. year old patient who presents to the clinic today for follow-up and to discuss test results.  Patient underwent in lab polysomnogram sleep study 9/29/2024.  She had 366 minutes of total sleep time with sleep efficiency of 95%.  Her AHI was only 1/h using 4% desaturations for hypopneas.  Lowest SpO2 was 90%.  PLMS index was 0/h.  Moderate snoring was noted.    Maintaining healthy weight can help with management and prevention of sleep disordered breathing issues.  She is requesting referral to nutritionist.  Avoiding alcohol and sedative medications can be helpful as well.  Recommend healthy sleep habits and ensuring adequate amount of nighttime sleep.  Side sleeping position can sometimes be beneficial with snoring, could consider body pillow to help prevent roll over onto back.  Some people may find oral appliance for snoring beneficial, could look into over-the-counter devices or discuss further with dentist.     She does use an over-the-counter sleep aid (Power to Sleep PM) which contains some natural ingredients as well as 1 mg of melatonin per softgel.  Did recommend running ingredient list by PCP and cardiologist to ensure they do not have concerns about ingredients and vitamins/minerals with other medications or health conditions.  We discussed that if she feels she needs further assistance with sleep in the future close placed referral for possible CBT-I evaluation with [facility] psychology or Dr. Kieran Thurman.  Handout on healthy sleep habits provided.          MEDICATIONS: reviewed     ALLERGIES:  Patient has no known allergies.    SOCIAL HISTORY (habits  "pertaining to sleep medicine):  Tobacco use: No   Alcohol use:   Caffeine use: 2     REVIEW OF SYSTEMS:   Pertinent positive symptoms are:  [place] Sleepiness Scale :Total score: 2           PHYSICAL EXAMINATION:  CONSTITUTIONAL:  Vitals:    10/25/24 0938   Pulse: 72   SpO2: 97%   Weight: 72.1 kg (159 lb)   Height: 165.1 cm (65\")    Body mass index is 26.46 kg/m².   HEAD: atraumatic, normocephalic  RESP SYSTEM: not in respiratory distress, breathing unlabored  CARDIOVASULAR: normal rate, no edema noted   NEURO: Alert and oriented x 3, mood and affect appeared appropriate              ASSESSMENT AND PLAN:  Snoring: Moderate on sleep study.  No evidence of sleep apnea.  Maintaining healthy weight can help with management and prevention of sleep disordered breathing issues.  Avoiding alcohol and sedative medications can be helpful as well.  Recommend healthy sleep habits and ensuring adequate amount of nighttime sleep.  Side sleeping position can sometimes be beneficial with snoring, could consider body pillow to help prevent roll over onto back.  Some people may find oral appliance for snoring beneficial, could look into over-the-counter devices or discuss further with dentist.   Overweight: Body mass index is 26.46 kg/m².. Patients who are overweight or obese are at increased risk of sleep apnea/ sleep disordered breathing. Weight reduction and healthy lifestyle are encouraged in overweight/ obese patients as part of a comprehensive approach to sleep apnea treatment.  Patient is requesting referral to nutritionist.  Will place this today.  We did discuss that we could consider additional testing in the future if she develops new or worsening symptoms or if symptoms fail to improve or if she were to gain weight in the future or have other changes.  History of trouble sleeping: She feels she is currently sleeping well with over-the-counter sleep aid that contains low-dose melatonin and other added ingredients as well " as vitamin/minerals.  We discussed recommendations to avoid long-term sleep aids if possible.  Also recommended that she run the ingredients by her primary care provider and cardiologist to ensure that they do not have concerns about them with her other medications or health conditions.  Over-the-counter supplements not regulated by FDA.  Provided handout on healthy sleep habits.  She will let us know if she desires referral to sleep psychologist to look into possible CBT-I in the future.      At this point patient prefers to follow-up as needed if symptoms worsen or fail to improve.  Patient's questions were answered.          Thank you for allowing me to participate in the care of this patient.     Janee Johnston DNP, APRN  [facility]

## 2024-10-25 ENCOUNTER — OFFICE VISIT (OUTPATIENT)
Dept: SLEEP MEDICINE | Facility: HOSPITAL | Age: 59
End: 2024-10-25
Payer: COMMERCIAL

## 2024-10-25 VITALS — HEIGHT: 65 IN | OXYGEN SATURATION: 97 % | BODY MASS INDEX: 26.49 KG/M2 | WEIGHT: 159 LBS | HEART RATE: 72 BPM

## 2024-10-25 DIAGNOSIS — R06.83 SNORING: Primary | ICD-10-CM

## 2024-10-25 DIAGNOSIS — E66.3 OVERWEIGHT (BMI 25.0-29.9): ICD-10-CM

## 2024-11-05 ENCOUNTER — TELEPHONE (OUTPATIENT)
Dept: SURGERY | Facility: CLINIC | Age: 59
End: 2024-11-05
Payer: COMMERCIAL

## 2024-11-05 NOTE — TELEPHONE ENCOUNTER
Message was left on patients voicemail to call Sarahi at the office.  I was returning her call.  Patient left a message on my voicemail that she wanted to reschedule her colonoscopy on 11/22/24.  Patient was given my office number to call.

## 2024-12-02 RX ORDER — HYDROCORTISONE 25 MG/G
CREAM TOPICAL
Qty: 30 G | Refills: 0 | Status: SHIPPED | OUTPATIENT
Start: 2024-12-02

## 2024-12-23 ENCOUNTER — HOSPITAL ENCOUNTER (OUTPATIENT)
Facility: HOSPITAL | Age: 59
Setting detail: HOSPITAL OUTPATIENT SURGERY
Discharge: HOME | End: 2024-12-23
Attending: COLON & RECTAL SURGERY | Admitting: COLON & RECTAL SURGERY
Payer: COMMERCIAL

## 2024-12-23 ENCOUNTER — ANESTHESIA (OUTPATIENT)
Dept: GASTROENTEROLOGY | Facility: HOSPITAL | Age: 59
End: 2024-12-23
Payer: COMMERCIAL

## 2024-12-23 ENCOUNTER — ANESTHESIA EVENT (OUTPATIENT)
Dept: GASTROENTEROLOGY | Facility: HOSPITAL | Age: 59
End: 2024-12-23
Payer: COMMERCIAL

## 2024-12-23 VITALS
BODY MASS INDEX: 26.77 KG/M2 | HEIGHT: 64 IN | RESPIRATION RATE: 16 BRPM | DIASTOLIC BLOOD PRESSURE: 67 MMHG | HEART RATE: 55 BPM | WEIGHT: 156.8 LBS | OXYGEN SATURATION: 97 % | SYSTOLIC BLOOD PRESSURE: 102 MMHG

## 2024-12-23 DIAGNOSIS — Z86.0100 HISTORY OF COLON POLYPS: ICD-10-CM

## 2024-12-23 PROCEDURE — 25010000002 PROPOFOL 10 MG/ML EMULSION: Performed by: NURSE ANESTHETIST, CERTIFIED REGISTERED

## 2024-12-23 PROCEDURE — 25810000003 LACTATED RINGERS PER 1000 ML: Performed by: COLON & RECTAL SURGERY

## 2024-12-23 PROCEDURE — 88305 TISSUE EXAM BY PATHOLOGIST: CPT | Performed by: COLON & RECTAL SURGERY

## 2024-12-23 PROCEDURE — 88342 IMHCHEM/IMCYTCHM 1ST ANTB: CPT | Performed by: COLON & RECTAL SURGERY

## 2024-12-23 PROCEDURE — 25010000002 LIDOCAINE 2% SOLUTION: Performed by: NURSE ANESTHETIST, CERTIFIED REGISTERED

## 2024-12-23 RX ORDER — PROPOFOL 10 MG/ML
VIAL (ML) INTRAVENOUS AS NEEDED
Status: DISCONTINUED | OUTPATIENT
Start: 2024-12-23 | End: 2024-12-23 | Stop reason: SURG

## 2024-12-23 RX ORDER — LIDOCAINE HYDROCHLORIDE 20 MG/ML
INJECTION, SOLUTION INFILTRATION; PERINEURAL AS NEEDED
Status: DISCONTINUED | OUTPATIENT
Start: 2024-12-23 | End: 2024-12-23 | Stop reason: SURG

## 2024-12-23 RX ORDER — SODIUM CHLORIDE, SODIUM LACTATE, POTASSIUM CHLORIDE, CALCIUM CHLORIDE 600; 310; 30; 20 MG/100ML; MG/100ML; MG/100ML; MG/100ML
30 INJECTION, SOLUTION INTRAVENOUS CONTINUOUS
Status: DISCONTINUED | OUTPATIENT
Start: 2024-12-23 | End: 2024-12-23 | Stop reason: HOSPADM

## 2024-12-23 RX ADMIN — SODIUM CHLORIDE, POTASSIUM CHLORIDE, SODIUM LACTATE AND CALCIUM CHLORIDE 30 ML/HR: 600; 310; 30; 20 INJECTION, SOLUTION INTRAVENOUS at 14:07

## 2024-12-23 RX ADMIN — PROPOFOL 180 MCG/KG/MIN: 10 INJECTION, EMULSION INTRAVENOUS at 14:53

## 2024-12-23 RX ADMIN — PROPOFOL 100 MG: 10 INJECTION, EMULSION INTRAVENOUS at 14:52

## 2024-12-23 RX ADMIN — LIDOCAINE HYDROCHLORIDE 60 MG: 20 INJECTION, SOLUTION INFILTRATION; PERINEURAL at 14:52

## 2024-12-23 NOTE — ANESTHESIA POSTPROCEDURE EVALUATION
Patient: Denisa Holt    Procedure Summary       Date: 12/23/24 Room / Location:  [facility]    Anesthesia Start: 1449 Anesthesia Stop: 1511    Procedure: COLONOSCOPY TO CECUM/TI WITH HOT SNARE POLYPECTOMY Diagnosis:       History of colon polyps      (History of colon polyps [Z86.010])    Surgeons: Leonel Calvillo MD Provider: Heidi Hallman MD    Anesthesia Type: MAC ASA Status: 2            Anesthesia Type: MAC    Vitals  Vitals Value Taken Time   BP     Temp     Pulse 62 12/23/24 1515   Resp     SpO2 98 % 12/23/24 1515   Vitals shown include unfiled device data.        Post Anesthesia Care and Evaluation    Anesthetic complications: No anesthetic complications
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
## 2024-12-23 NOTE — ANESTHESIA PREPROCEDURE EVALUATION
Anesthesia Evaluation     Patient summary reviewed and Nursing notes reviewed   no history of anesthetic complications:   NPO Solid Status: > 8 hours  NPO Liquid Status: > 4 hours           Airway   Mallampati: II  Neck ROM: full  Dental - normal exam     Pulmonary    (+) a smoker Former, asthma,shortness of breath  (-) recent URI  Cardiovascular     (+) hypertension, dysrhythmias, IGLESIAS, hyperlipidemia  Cardiac stents: poss Afib.      Neuro/Psych  (+) CVA, dizziness/light headedness, psychiatric history Depression  (-) seizures  GI/Hepatic/Renal/Endo    (+) GI bleeding , thyroid problem     Musculoskeletal     Abdominal    Substance History      OB/GYN          Other   arthritis,                   Anesthesia Plan    ASA 2     MAC     intravenous induction     Anesthetic plan, risks, benefits, and alternatives have been provided, discussed and informed consent has been obtained with: patient.    CODE STATUS:

## 2024-12-23 NOTE — DISCHARGE INSTRUCTIONS
For the next 24 hours patient needs to be with a responsible adult.    For 24 hours DO NOT drive, operate machinery, appliances, drink alcohol, make important decisions or sign legal documents.    Start with a light or bland diet if you are feeling sick to your stomach otherwise advance to regular diet as tolerated.    Follow recommendations on procedure report if provided by your doctor.    Call Dr Calvillo for problems [phone]    Problems may include but not limited to: large amounts of bleeding, trouble breathing, repeated vomiting, severe unrelieved pain, fever or chills.

## 2024-12-27 LAB
CYTO UR: NORMAL
LAB AP CASE REPORT: NORMAL
LAB AP DIAGNOSIS COMMENT: NORMAL
LAB AP SPECIAL STAINS: NORMAL
PATH REPORT.FINAL DX SPEC: NORMAL
PATH REPORT.GROSS SPEC: NORMAL

## (undated) DEVICE — Device: Brand: DEFENDO AIR/WATER/SUCTION AND BIOPSY VALVE

## (undated) DEVICE — LN SMPL CO2 SHTRM SD STREAM W/M LUER

## (undated) DEVICE — ADAPT CLN BIOGUARD AIR/H2O DISP

## (undated) DEVICE — TUBING, SUCTION, 1/4" X 10', STRAIGHT: Brand: MEDLINE

## (undated) DEVICE — PATIENT RETURN ELECTRODE, SINGLE-USE, CONTACT QUALITY MONITORING, ADULT, WITH 9FT CORD, FOR PATIENTS WEIGING OVER 33LBS. (15KG): Brand: MEGADYNE

## (undated) DEVICE — THE SINGLE USE ETRAP – POLYP TRAP IS USED FOR SUCTION RETRIEVAL OF ENDOSCOPICALLY REMOVED POLYPS.: Brand: ETRAP

## (undated) DEVICE — FRCP BX RADJAW4 NDL 2.8 240CM LG OG BX40

## (undated) DEVICE — CANN O2 ETCO2 FITS ALL CONN CO2 SMPL A/ 7IN DISP LF

## (undated) DEVICE — CANN NASL CO2 TRULINK W/O2 A/

## (undated) DEVICE — SNAR POLYP SENSATION STDOVL 27 240 BX40

## (undated) DEVICE — SENSR O2 OXIMAX FNGR A/ 18IN NONSTR

## (undated) DEVICE — KT ORCA ORCAPOD DISP STRL

## (undated) DEVICE — THE TORRENT IRRIGATION SCOPE CONNECTOR IS USED WITH THE TORRENT IRRIGATION TUBING TO PROVIDE IRRIGATION FLUIDS SUCH AS STERILE WATER DURING GASTROINTESTINAL ENDOSCOPIC PROCEDURES WHEN USED IN CONJUNCTION WITH AN IRRIGATION PUMP (OR ELECTROSURGICAL UNIT).: Brand: TORRENT